# Patient Record
Sex: MALE | Race: WHITE | Employment: FULL TIME | URBAN - METROPOLITAN AREA
[De-identification: names, ages, dates, MRNs, and addresses within clinical notes are randomized per-mention and may not be internally consistent; named-entity substitution may affect disease eponyms.]

---

## 2018-08-01 VITALS
RESPIRATION RATE: 20 BRPM | OXYGEN SATURATION: 96 % | DIASTOLIC BLOOD PRESSURE: 92 MMHG | HEIGHT: 72 IN | HEART RATE: 98 BPM | WEIGHT: 214 LBS | BODY MASS INDEX: 28.99 KG/M2 | SYSTOLIC BLOOD PRESSURE: 166 MMHG | TEMPERATURE: 98.1 F

## 2018-08-01 PROCEDURE — 99282 EMERGENCY DEPT VISIT SF MDM: CPT

## 2018-08-01 RX ORDER — METFORMIN HYDROCHLORIDE 500 MG/1
1000 TABLET ORAL 2 TIMES DAILY WITH MEALS
COMMUNITY

## 2018-08-01 RX ORDER — CLOPIDOGREL BISULFATE 75 MG/1
75 TABLET ORAL
COMMUNITY

## 2018-08-01 RX ORDER — METOPROLOL TARTRATE 100 MG/1
TABLET ORAL 2 TIMES DAILY
COMMUNITY

## 2018-08-01 RX ORDER — GUAIFENESIN 100 MG/5ML
81 LIQUID (ML) ORAL DAILY
COMMUNITY

## 2018-08-01 RX ORDER — LOSARTAN POTASSIUM 100 MG/1
100 TABLET ORAL DAILY
COMMUNITY

## 2018-08-01 RX ORDER — LORATADINE 10 MG/1
10 TABLET ORAL
COMMUNITY

## 2018-08-02 ENCOUNTER — HOSPITAL ENCOUNTER (EMERGENCY)
Age: 53
Discharge: HOME | End: 2018-08-02
Payer: OTHER MISCELLANEOUS

## 2018-08-02 ENCOUNTER — APPOINTMENT (OUTPATIENT)
Dept: GENERAL RADIOLOGY | Age: 53
End: 2018-08-02
Payer: OTHER MISCELLANEOUS

## 2018-08-02 DIAGNOSIS — S61.412A LACERATION OF LEFT HAND WITHOUT FOREIGN BODY, INITIAL ENCOUNTER: Primary | ICD-10-CM

## 2018-08-02 DIAGNOSIS — S81.811A LACERATION OF RIGHT LOWER LEG, INITIAL ENCOUNTER: ICD-10-CM

## 2018-08-02 PROCEDURE — 73590 X-RAY EXAM OF LOWER LEG: CPT

## 2018-08-02 PROCEDURE — 77030002916 HC SUT ETHLN J&J -A

## 2018-08-02 PROCEDURE — 77030002986 HC SUT PROL J&J -A

## 2018-08-02 PROCEDURE — 74011250636 HC RX REV CODE- 250/636

## 2018-08-02 PROCEDURE — 75810000293 HC SIMP/SUPERF WND  RPR

## 2018-08-02 PROCEDURE — 73130 X-RAY EXAM OF HAND: CPT

## 2018-08-02 RX ORDER — LIDOCAINE HYDROCHLORIDE 10 MG/ML
10 INJECTION INFILTRATION; PERINEURAL
Status: COMPLETED | OUTPATIENT
Start: 2018-08-02 | End: 2018-08-02

## 2018-08-02 RX ORDER — CEPHALEXIN 500 MG/1
500 CAPSULE ORAL 4 TIMES DAILY
Qty: 28 CAP | Refills: 0 | Status: SHIPPED | OUTPATIENT
Start: 2018-08-02 | End: 2018-08-09

## 2018-08-02 RX ORDER — LIDOCAINE HYDROCHLORIDE 10 MG/ML
INJECTION INFILTRATION; PERINEURAL
Status: DISCONTINUED
Start: 2018-08-02 | End: 2018-08-02 | Stop reason: HOSPADM

## 2018-08-02 RX ADMIN — LIDOCAINE HYDROCHLORIDE 10 ML: 10 INJECTION, SOLUTION INFILTRATION; PERINEURAL at 01:26

## 2018-08-02 NOTE — ED PROVIDER NOTES
HPI Comments:  
 
29-year-old male fell at work lacerating his right lower leg and left hand on metal grate. He did not strike his head and did not lose consciousness and did not strike his abdomen or chest.  Patient is on Plavix secondary to CAD. Patient is a 52 y.o. male presenting with skin laceration. The history is provided by the patient and the spouse. Laceration The incident occurred less than 1 hour ago. The laceration is located on the left hand and right leg. The laceration is 4 cm in size. The injury mechanism is a metal edge. Foreign body present: no. The pain is mild. The pain has been constant since onset. Pertinent negatives include no numbness. Past Medical History:  
Diagnosis Date  CAD (coronary artery disease) History reviewed. No pertinent surgical history. History reviewed. No pertinent family history. Social History Social History  Marital status:  Spouse name: N/A  
 Number of children: N/A  
 Years of education: N/A Occupational History  Not on file. Social History Main Topics  Smoking status: Not on file  Smokeless tobacco: Not on file  Alcohol use Not on file  Drug use: Not on file  Sexual activity: Not on file Other Topics Concern  Not on file Social History Narrative  No narrative on file ALLERGIES: Review of patient's allergies indicates no known allergies. Review of Systems Constitutional: Negative. Negative for activity change. HENT: Negative. Eyes: Negative. Respiratory: Negative. Cardiovascular: Negative. Gastrointestinal: Negative. Genitourinary: Negative. Musculoskeletal: Negative. Skin: Negative. Neurological: Negative. Negative for numbness. Psychiatric/Behavioral: Negative. All other systems reviewed and are negative. Vitals:  
 08/01/18 2347 BP: (!) 166/92 Pulse: 98 Resp: 20 Temp: 98.1 °F (36.7 °C) SpO2: 96% Weight: 97.1 kg (214 lb) Height: 6' (1.829 m) Physical Exam  
Constitutional: He is oriented to person, place, and time. He appears well-developed and well-nourished. No distress. HENT:  
Head: Normocephalic and atraumatic. Right Ear: External ear normal.  
Left Ear: External ear normal.  
Nose: Nose normal.  
Mouth/Throat: Oropharynx is clear and moist. No oropharyngeal exudate. Eyes: Conjunctivae and EOM are normal. Pupils are equal, round, and reactive to light. Right eye exhibits no discharge. Left eye exhibits no discharge. No scleral icterus. Neck: Normal range of motion. Neck supple. No JVD present. No tracheal deviation present. Cardiovascular: Normal rate, regular rhythm and intact distal pulses. Pulmonary/Chest: Effort normal and breath sounds normal. No stridor. No respiratory distress. He has no wheezes. He exhibits no tenderness. Abdominal: Soft. Bowel sounds are normal. He exhibits no distension and no mass. There is no tenderness. Musculoskeletal: Normal range of motion. He exhibits no edema or tenderness. Left hand: He exhibits laceration. Hands: 
     Right lower leg: He exhibits laceration. Legs: 
Neurological: He is alert and oriented to person, place, and time. No cranial nerve deficit. Skin: Skin is warm and dry. No rash noted. He is not diaphoretic. No erythema. No pallor. Psychiatric: He has a normal mood and affect. His behavior is normal. Thought content normal.  
Nursing note and vitals reviewed. MDM Number of Diagnoses or Management Options Diagnosis management comments: Assessment laceration to hand and leg. Plan suture repair after thorough cleaning and dressing. Antibiotics for prophylaxis Amount and/or Complexity of Data Reviewed Tests in the radiology section of CPT®: ordered and reviewed ED Course Wound Repair 
Date/Time: 8/2/2018 1:46 AM 
Performed by: attendingPreparation: skin prepped with Betadine and skin prepped with Shur-Clens Location details: right leg Wound length:2.5 cm or less Anesthesia: local infiltration Anesthesia: 
Local Anesthetic: lidocaine 1% without epinephrine Foreign bodies: no foreign bodies Irrigation solution: saline Irrigation method: syringe Debridement: minimal 
Skin closure: Prolene and 4-0 nylon Number of sutures: 3 Technique: simple Approximation: loose Dressing: antibiotic ointment and non-adhesive packing strip Patient tolerance: Patient tolerated the procedure well with no immediate complications My total time at bedside, performing this procedure was 1-15 minutes. Wound Repair 
Date/Time: 8/2/2018 1:47 AM 
Performed by: attendingPreparation: skin prepped with Shur-Clens, skin prepped with Betadine and sterile field established Location details: left hand Wound length:2.5 cm or less Anesthesia: local infiltration Foreign bodies: no foreign bodies Irrigation solution: saline Irrigation method: syringe Debridement: minimal 
Skin closure: 4-0 nylon and Prolene Number of sutures: 4 Technique: simple Approximation: loose Dressing: non-adhesive packing strip and antibiotic ointment Patient tolerance: Patient tolerated the procedure well with no immediate complications My total time at bedside, performing this procedure was 1-15 minutes.

## 2018-08-02 NOTE — ED NOTES
I have reviewed discharge instructions with the patient. The patient verbalized understanding. Patient left ED via Discharge Method: ambulatory to Home with (insert name of family/friend, self, transport wife). Opportunity for questions and clarification provided. Patient given 1 scripts. To continue your aftercare when you leave the hospital, you may receive an automated call from our care team to check in on how you are doing. This is a free service and part of our promise to provide the best care and service to meet your aftercare needs.  If you have questions, or wish to unsubscribe from this service please call [phone]. Thank you for Choosing our [facility] Department.

## 2018-08-02 NOTE — ED NOTES
Dressings on the left shin and the right fold of 2nd finger, areas cleansed and then xeroform guaze applied and coban

## 2018-08-02 NOTE — DISCHARGE INSTRUCTIONS

## 2018-08-31 ENCOUNTER — HOSPITAL ENCOUNTER (OUTPATIENT)
Dept: PHYSICAL THERAPY | Age: 53
Discharge: HOME | End: 2018-08-31
Payer: COMMERCIAL

## 2018-08-31 PROCEDURE — 97140 MANUAL THERAPY 1/> REGIONS: CPT

## 2018-08-31 PROCEDURE — 97161 PT EVAL LOW COMPLEX 20 MIN: CPT

## 2018-08-31 PROCEDURE — 97110 THERAPEUTIC EXERCISES: CPT

## 2018-08-31 NOTE — THERAPY EVALUATION
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 5 = High Risk)  MD Orders: evaluate and treat MEDICAL/REFERRING DIAGNOSIS:Right ankle sprain,chondromalacia right knee  DATE OF ONSET:   REFERRING PHYSICIAN:Sammie Deluna PA  RETURN PHYSICIAN APPOINTMENT: did not specify      INITIAL ASSESSMENT:  Mr. Warden Bautista presents to physical therapy with symptoms of R knee and ankle pain after a fall at work. The examination reveals decreased knee and ankle ROM, quad weakness and decreased overall function due to pain. The examination is of low complexity due to a stable clinical presentation. Skilled physical therapy is recommended to progress the plan of care in order for the patient to return to full function with minimal symptoms. PROBLEM LIST (Impacting functional limitations):  1. Decreased Strength  2. Decreased ADL/Functional Activities  3. Increased Pain  4. Decreased Activity Tolerance  5. Decreased Flexibility/Joint Mobility  6. Decreased [place] with Home Exercise Program INTERVENTIONS PLANNED:  1. Cold  2. Heat  3. Home Exercise Program (HEP)  4. Manual Therapy  5. Range of Motion (ROM)  6. Therapeutic Exercise/Strengthening     TREATMENT PLAN:  Effective Dates: 18 TO 10-31-18. Frequency/Duration: 2 times a week for 2 weeks and 1x/wk for the next 2 weeks  GOALS: (Goals have been discussed and agreed upon with patient.)  SHORT-TERM FUNCTIONAL GOALS: Time Frame: 2-4 wks  1. Patient will report 25-50% reduction in overall symptoms  2. Patient will be compliant with HEP and plan of care  3.   Patient will have 0-125 deg ROM of the knee  4. Patient will improve swelling by 1 cm on the figure 8 measurement   5. Patient will improve on the LEFS by 5-7 points  DISCHARGE GOALS: Time Frame: 10-12 wk  1. Patient will report % reduction in overall symptoms in order to be able to have full function with decreased symptoms  2. Patient will be able to squat with minimal pain as needed at his job with minimal pain (0-2/10)  3. Patient will be able to ambulate with a symmetrical gait pattern as judged by therapist in order to able to perform activities of daily living  4. Patient will score 60 or greater on the LEFS points in order to demonstrate improved function with less pain    Rehabilitation Potential For Stated Goals: Good    Regarding Deitra Caprice therapy, I certify that the treatment plan above will be carried out by a therapist or under their direction. Thank you for this referral,  Lena Tirado PT,DPT    Referring Physician Signature: SHAWN Parra _____________________________________________________ Date: __________________________________          HISTORY:   History of Present Injury/Illness (Reason for Referral): Reports that he fell at work on 8-1-18. He had puncture wounds in his lower leg. He had a lot of bleeding during that time requiring stiches in one of the wounds. He had an MRI on his knee revealed a meniscus tear. He went to the medical facility at [address] [place] [address] and they sent him to the ED where he got stiches for his wounds. The next day he went to the work well doctor and was then referred to Dr. Nishi Ruggiero. He also had hand tingling afterwards. He was put on a blood thinner to prevent blood clots after having a negative ultrasound. He had a steroid shot in the knee with minimal benefit at this point. No numbness in the foot reported. 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 2 stents, diabetes type II   Social History/Living Environment: lives in a house with family  Prior Level of Function/Work/Activity:independent / works at [employer]  Current Medications:   Aspirin, losartan, clopidogrel, omprezole, atovastatin, metoprolol, metformin, claratan   Date Last Reviewed:  9/3/2018   Number of Personal Factors/Comorbidities that affect the Plan of Care: 0: LOW COMPLEXITY   EXAMINATION:   (Abbreviations: P-pain, NP- no pain, wnl-within normal limits)    Observation/Orthostatic Postural Assessment:    Relaxed standing posture:  Several healing wounds noted on anterior portion of his R lower leg    Palpation/tone/tissue texture:    ASIS: n/a  PSIS:n/a  Leg Length: supine:n/a         Long Sitting: n/a      Soft tissue:  · Hip flexors:n/a  · Hamstrings: increased tightness on R  · IT band: na  · gastroc/soleus/posterior tibialis: increased tightness on R  · Anterior tibialis: Increased tightness on R      Pulses: Dorsal pedis: R: 1+      Posterior tibialis: R 1+    Edema measurements:  Date:  8-31-18      Activity/Exercise Left  Right   Figure 8 56 cm 59 cm   Mid calf 41 cm 42cm       ROM:   Knee ROM Date:  8-31-18       Right Left   Flexion 108 140   Extension 15 0      Ankle ROM Date:  8-31-18       Right Left   DF 0 10   PF n/a wnl   Inversion - -   Eversion - -           Strength:     Hip Strength Date:  8-31-18       Right Left   Flexion 5 5   Extension 4+ 5   IR n/a n/a   ER n/a n/a   Abduction 5 5      Knee Strength Date:  8-31-18       Right Left   Flexion 4 5   Extension 4 5             Special Tests:   Stability tests:  Lachmans: (-)  [place]:  AM: (-)          AL: (-)  Posterior drawer: (-)  Posterior Sag: (-)    Varus: 0 deg: (-)   30 deg: (-)  Valgus: 0 deg: (-)   30 deg: (+) mild pain on R    Meniscus cluster test  1. End range flexion: -  2. End range extension: + for pain/tightness   3. Joint line tenderness: medical joint line tenderness   4. Report of locking: (-)  5.  [place]reys:     Neurological Screen:   Myotomes: strong in bilateral LE's     Dermatomes: intact in bilateral LE's         Reflexes: n/a         Neural Tension Tests: -  Functional Mobility:    · Double leg squat: n/a  · Single leg squat:  L:n/a         R:n/a  · Gait Pattern: ambulates with decreased terminal stance on R   Balance:  Single leg   L: n/a  R:n/a        Body Structures Involved:  1. Joints  2. Muscles  3. Ligaments Body Functions Affected:  1. Sensory/Pain  2. Neuromusculoskeletal  3. Movement Related Activities and Participation Affected:  1. General Tasks and Demands  2. Mobility  3. Self Care  4. Domestic Life  5. Interpersonal Interactions and Relationships  6. Community, Social and [place]   Number of elements that affect the Plan of Care: 4+: HIGH COMPLEXITY   CLINICAL PRESENTATION:   Presentation: Stable and uncomplicated: LOW COMPLEXITY   CLINICAL DECISION MAKING:   Outcome Measure: Tool Used: Lower Extremity Functional Scale (LEFS)  Score:  Initial: 27/80 Most Recent: X/80 (Date: -- )   Interpretation of Score: 20 questions each scored on a 5 point scale with 0 representing \"extreme difficulty or unable to perform\" and 4 representing \"no difficulty\". The lower the score, the greater the functional disability. 80/80 represents no disability. Minimal detectable change is 9 points. Score 80 79-63 62-48 47-32 31-16 15-1 0   Modifier CH CI CJ CK CL CM CN     Medical Necessity:   · Patient is expected to demonstrate progress in strength, range of motion and symptom levels to return to full function. Reason for Services/Other Comments:  · Patient continues to require skilled intervention due to ongoing symptoms. Use of outcome tool(s) and clinical judgement create a POC that gives a: Clear prediction of patient's progress: LOW COMPLEXITY   TREATMENT:   (In addition to Assessment/Re-Assessment sessions the following treatments were rendered)    Subjective Pre-Treatment Symptoms: Reports that he has moderate pain but is slowly improving.  Still has a hard time with knee motion. Therapeutic Exercise: (10 min) Done in order to improve strength, ROM and understanding of current condition. Date:  8-31-18   Activity/Exercise Parameters   Education Discussed examination findings, HEP, plan of care   Taping Taped patella into distraction, used 2 I strips   Heel slides 10x   ankle PF/DF 20x       Manual Therapy: (10) Done in order to improve joint and soft tissue mobility,reduce muscle guarding, and decrease muscle tone  · Knee extension mobilization, grade III-IV  · Patella mobilization, all directions  Modalities: (15 min) Done in order to reduce swelling and pain  Cryotherapy w/ vaso-pneumatic compression, 15 min, 34 deg  Treatment/Session Assessment:  Patient tolerated the session well. His HEP and plan of care were discussed. · Pain: Initial:    3/10 at rest, 6/10 with movement Post Session:  Same as initial  ·   Compliance with Program/Exercises: Will assess as treatment progresses. · Recommendations/Intent for next treatment session: \"Next visit will focus on progressing the patients plan of care\".     Future Appointments  Date Time Provider Alan Pulnkett   9/5/2018 2:15 PM Tom Frank, PT, DPT [facility]   9/7/2018 10:00 AM Tom Marie PT, DPT [facility]   9/10/2018 9:15 AM Tom Frank PT, DPT [facility]   9/12/2018 8:30 AM Tom Frank, PT, DPT [facility]   9/17/2018 10:00 AM Tom Frank, PT, DPT [facility]   9/19/2018 10:00 AM Tom Frank PT, DPT [facility]   9/26/2018 10:45 AM Tom Frank, PT, DPT [facility]   10/3/2018 10:00 AM Tom Frank, PT, DPT [facility]     Total Treatment Duration: 35 min, evaluation  30 min  PT Patient Time In/Time Out  Time In: 1045  Time Out: Democracia [id number] PT, DPT

## 2018-09-03 NOTE — PROGRESS NOTES
Ambulatory/Rehab Services H2 Model Falls Risk Assessment    Risk Factor Pts. ·   Confusion/Disorientation/Impulsivity  []    4 ·   Symptomatic Depression  []   2 ·   Altered Elimination  []   1 ·   Dizziness/Vertigo  []   1 ·   Gender (Male)  [x]   1 ·   Any administered antiepileptics (anticonvulsants):  []   2 ·   Any administered benzodiazepines:  []   1 ·   Visual Impairment (specify):  []   1 ·   Portable Oxygen Use  []   1 ·   Orthostatic ? BP  []   1 ·   History of Recent Falls (within 3 mos.)  []   5     Ability to Rise from Chair (choose one) Pts. ·   Ability to rise in a single movement  [x]   0 ·   Pushes up, successful in one attempt  []   1 ·   Multiple attempts, but successful  []   3 ·   Unable to rise without assistance  []   4   Total: (5 or greater = High Risk) 1     Falls Prevention Plan:   []                Physical Limitations to Exercise (specify):   []                Mobility Assistance Device (type):   []                Exercise/Equipment Adaptation (specify):    ©2010 [facility] [address] Patent #[id number].  Federal Law prohibits the replication, distribution or use without written permission from [facility] [employer]

## 2018-09-05 ENCOUNTER — HOSPITAL ENCOUNTER (OUTPATIENT)
Dept: PHYSICAL THERAPY | Age: 53
Discharge: HOME | End: 2018-09-05
Payer: COMMERCIAL

## 2018-09-05 PROCEDURE — 97110 THERAPEUTIC EXERCISES: CPT

## 2018-09-05 PROCEDURE — 97140 MANUAL THERAPY 1/> REGIONS: CPT

## 2018-09-05 NOTE — PROGRESS NOTES
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 5 = High Risk)  MD Orders: evaluate and treat MEDICAL/REFERRING DIAGNOSIS:Right ankle sprain,chondromalacia right knee  DATE OF ONSET:   REFERRING PHYSICIAN:Rancho Deluna PA  RETURN PHYSICIAN APPOINTMENT: did not specify      INITIAL ASSESSMENT:  Mr. Lacy Mejia presents to physical therapy with symptoms of R knee and ankle pain after a fall at work. The examination reveals decreased knee and ankle ROM, quad weakness and decreased overall function due to pain. The examination is of low complexity due to a stable clinical presentation. Skilled physical therapy is recommended to progress the plan of care in order for the patient to return to full function with minimal symptoms. PROBLEM LIST (Impacting functional limitations):  1. Decreased Strength  2. Decreased ADL/Functional Activities  3. Increased Pain  4. Decreased Activity Tolerance  5. Decreased Flexibility/Joint Mobility  6. Decreased [place] with Home Exercise Program INTERVENTIONS PLANNED:  1. Cold  2. Heat  3. Home Exercise Program (HEP)  4. Manual Therapy  5. Range of Motion (ROM)  6. Therapeutic Exercise/Strengthening     TREATMENT PLAN:  Effective Dates: 18 TO 10-31-18. Frequency/Duration: 2 times a week for 2 weeks and 1x/wk for the next 2 weeks  GOALS: (Goals have been discussed and agreed upon with patient.)  SHORT-TERM FUNCTIONAL GOALS: Time Frame: 2-4 wks  1. Patient will report 25-50% reduction in overall symptoms  2. Patient will be compliant with HEP and plan of care  3.   Patient will have 0-125 deg ROM of the knee  4. Patient will improve swelling by 1 cm on the figure 8 measurement   5. Patient will improve on the LEFS by 5-7 points  DISCHARGE GOALS: Time Frame: 10-12 wk  1. Patient will report % reduction in overall symptoms in order to be able to have full function with decreased symptoms  2. Patient will be able to squat with minimal pain as needed at his job with minimal pain (0-2/10)  3. Patient will be able to ambulate with a symmetrical gait pattern as judged by therapist in order to able to perform activities of daily living  4. Patient will score 60 or greater on the LEFS points in order to demonstrate improved function with less pain    Rehabilitation Potential For Stated Goals: Good    Regarding Eldon Heller therapy, I certify that the treatment plan above will be carried out by a therapist or under their direction. Thank you for this referral,  Patsy Case PT,DPT    Referring Physician Signature: SHAWN Rae _____________________________________________________ Date: __________________________________          HISTORY:   History of Present Injury/Illness (Reason for Referral): Reports that he fell at work on 8-1-18. He had puncture wounds in his lower leg. He had a lot of bleeding during that time requiring stiches in one of the wounds. He had an MRI on his knee revealed a meniscus tear. He went to the medical facility at [address] [place] [address] and they sent him to the ED where he got stiches for his wounds. The next day he went to the work well doctor and was then referred to Dr. Rosa Vo. He also had hand tingling afterwards. He was put on a blood thinner to prevent blood clots after having a negative ultrasound. He had a steroid shot in the knee with minimal benefit at this point. No numbness in the foot reported. 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 2 stents, diabetes type II   Social History/Living Environment: lives in a house with family  Prior Level of Function/Work/Activity:independent / works at [employer]  Current Medications:   Aspirin, losartan, clopidogrel, omprezole, atovastatin, metoprolol, metformin, claratan   Date Last Reviewed:  9/5/2018   Number of Personal Factors/Comorbidities that affect the Plan of Care: 0: LOW COMPLEXITY   EXAMINATION:   (Abbreviations: P-pain, NP- no pain, wnl-within normal limits)    Observation/Orthostatic Postural Assessment:    Relaxed standing posture:  Several healing wounds noted on anterior portion of his R lower leg    Palpation/tone/tissue texture:    ASIS: n/a  PSIS:n/a  Leg Length: supine:n/a         Long Sitting: n/a      Soft tissue:  · Hip flexors:n/a  · Hamstrings: increased tightness on R  · IT band: na  · gastroc/soleus/posterior tibialis: increased tightness on R  · Anterior tibialis: Increased tightness on R      Pulses: Dorsal pedis: R: 1+      Posterior tibialis: R 1+    Edema measurements:  Date:  8-31-18      Activity/Exercise Left  Right   Figure 8 56 cm 59 cm   Mid calf 41 cm 42cm       ROM:   Knee ROM Date:  8-31-18       Right Left   Flexion 108 140   Extension 15 0      Ankle ROM Date:  8-31-18       Right Left   DF 0 10   PF n/a wnl   Inversion - -   Eversion - -           Strength:     Hip Strength Date:  8-31-18       Right Left   Flexion 5 5   Extension 4+ 5   IR n/a n/a   ER n/a n/a   Abduction 5 5      Knee Strength Date:  8-31-18       Right Left   Flexion 4 5   Extension 4 5             Special Tests:   Stability tests:  Lachmans: (-)  Slocums:  AM: (-)          AL: (-)  Posterior drawer: (-)  Posterior Sag: (-)    Varus: 0 deg: (-)   30 deg: (-)  Valgus: 0 deg: (-)   30 deg: (+) mild pain on R    Meniscus cluster test  1. End range flexion: -  2. End range extension: + for pain/tightness   3. Joint line tenderness: medical joint line tenderness   4. Report of locking: (-)  5.  McMurreys:     Neurological Screen:   Myotomes: strong in bilateral LE's     Dermatomes: intact in bilateral LE's         Reflexes: n/a         Neural Tension Tests: -  Functional Mobility:    · Double leg squat: n/a  · Single leg squat:  L:n/a         R:n/a  · Gait Pattern: ambulates with decreased terminal stance on R   Balance:  Single leg   L: n/a  R:n/a        Body Structures Involved:  1. Joints  2. Muscles  3. Ligaments Body Functions Affected:  1. Sensory/Pain  2. Neuromusculoskeletal  3. Movement Related Activities and Participation Affected:  1. General Tasks and Demands  2. Mobility  3. Self Care  4. Domestic Life  5. Interpersonal Interactions and Relationships  6. Community, Social and [place]   Number of elements that affect the Plan of Care: 4+: HIGH COMPLEXITY   CLINICAL PRESENTATION:   Presentation: Stable and uncomplicated: LOW COMPLEXITY   CLINICAL DECISION MAKING:   Outcome Measure: Tool Used: Lower Extremity Functional Scale (LEFS)  Score:  Initial: 27/80 Most Recent: X/80 (Date: -- )   Interpretation of Score: 20 questions each scored on a 5 point scale with 0 representing \"extreme difficulty or unable to perform\" and 4 representing \"no difficulty\". The lower the score, the greater the functional disability. 80/80 represents no disability. Minimal detectable change is 9 points. Score 80 79-63 62-48 47-32 31-16 15-1 0   Modifier CH CI CJ CK CL CM CN     Medical Necessity:   · Patient is expected to demonstrate progress in strength, range of motion and symptom levels to return to full function. Reason for Services/Other Comments:  · Patient continues to require skilled intervention due to ongoing symptoms. Use of outcome tool(s) and clinical judgement create a POC that gives a: Clear prediction of patient's progress: LOW COMPLEXITY   TREATMENT:   (In addition to Assessment/Re-Assessment sessions the following treatments were rendered)    Subjective Pre-Treatment Symptoms: Reports that he has moderate pain the day after the evaluation. It calmed down some but still hurts. Therapeutic Exercise: (25 min) Done in order to improve strength, ROM and understanding of current condition. Date:  8-31-18 Date  9-5-18   Activity/Exercise Parameters    Education Discussed examination findings, HEP, plan of care · Discussed HEP  · Discussed self soft tissue of the anterior lower leg compartment    Biking  10 min   Taping Taped patella into distraction, used 2 I strips Taped patella into distraction using 2 I strips of kinesio tape   Heel slides 10x HEP   ankle PF/DF 20x 10x   SLR  10x, 3 sets   Side-lying abd  In hip extension, 10x, 3 sets   Gait training  Worked on terminal knee extension with gait   Manual Therapy: (20) Done in order to improve joint and soft tissue mobility,reduce muscle guarding, and decrease muscle tone  · Knee extension mobilization to tibia and femur and with gentle traction, grade III-IV  · Patella mobilization, superior-inferior, medial-lateral, distraction using suction   · Talocrural posterior glide to facilitated DF   · Calcaneal distraction with DF of the ankle  Modalities: (15 min) Done in order to reduce swelling and pain  Ice x 15 min to R knee  Treatment/Session Assessment:  Patient tolerated the session well. He was able to get full knee extension and improved patella mobility but still had tenderness with gait and knee flexion. · Pain: Initial:    Mild to moderate symptoms reported. Post Session:  Same as initial  ·   Compliance with Program/Exercises: Will assess as treatment progresses. · Recommendations/Intent for next treatment session: \"Next visit will focus on progressing the patients plan of care\".     Future Appointments  Date Time Provider Alan Plunkett   9/7/2018 10:00 AM Quintin Tapia PT, DPT [facility]   9/10/2018 9:15 AM Quintin Tapia PT, DPT [facility]   9/12/2018 8:30 AM Quintin Tapia PT, DPT [facility]   9/17/2018 10:00 AM Quintin Tapia PT, DPT [facility]   9/19/2018 10:00 AM [facility]   9/26/2018 10:45 AM [facility]andy Roa PT, [facility]   10/3/2018 10:00 AM Yandy Roa PT, [facility]     Total Treatment Duration: 60 min  PT Patient Time In/Time Out  Time In: 1415  Time Out: Jordan ENGLISH DPT

## 2018-09-07 ENCOUNTER — HOSPITAL ENCOUNTER (OUTPATIENT)
Dept: PHYSICAL THERAPY | Age: 53
Discharge: HOME | End: 2018-09-07
Payer: COMMERCIAL

## 2018-09-07 NOTE — PROGRESS NOTES
Javier Perez  : 1965  Primary: Sary Radha Sahu  Secondary:  [address]  at [facility]  [facility] [address], [place] [address]  [phone]   [phone]        OUTPATIENT DAILY NOTE    NAME/AGE/GENDER: Jaiver Perez is a 52 y.o. male. DATE: 2018    Patient cancelled his appointment today. Will plan to follow up on next scheduled visit.     Hira Livingston, PT, DPT

## 2018-09-10 ENCOUNTER — HOSPITAL ENCOUNTER (OUTPATIENT)
Dept: PHYSICAL THERAPY | Age: 53
Discharge: HOME | End: 2018-09-10
Payer: COMMERCIAL

## 2018-09-10 PROCEDURE — 97110 THERAPEUTIC EXERCISES: CPT

## 2018-09-10 PROCEDURE — 97140 MANUAL THERAPY 1/> REGIONS: CPT

## 2018-09-10 NOTE — PROGRESS NOTES
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 5 = High Risk)  MD Orders: evaluate and treat MEDICAL/REFERRING DIAGNOSIS:Right ankle sprain,chondromalacia right knee  DATE OF ONSET:   REFERRING PHYSICIAN:Ida Deluna PA  RETURN PHYSICIAN APPOINTMENT: did not specify       ASSESSMENT:  Mr. Evan Peterson presents to physical therapy with symptoms of R knee and ankle pain after a fall at work. The examination reveals decreased knee and ankle ROM, quad weakness and decreased overall function due to pain. The examination is of low complexity due to a stable clinical presentation. Skilled physical therapy is recommended to progress the plan of care in order for the patient to return to full function with minimal symptoms. PROBLEM LIST (Impacting functional limitations):  1. Decreased Strength  2. Decreased ADL/Functional Activities  3. Increased Pain  4. Decreased Activity Tolerance  5. Decreased Flexibility/Joint Mobility  6. Decreased [place] with Home Exercise Program INTERVENTIONS PLANNED:  1. Cold  2. Heat  3. Home Exercise Program (HEP)  4. Manual Therapy  5. Range of Motion (ROM)  6. Therapeutic Exercise/Strengthening     TREATMENT PLAN:  Effective Dates: 18 TO 10-31-18. Frequency/Duration: 2 times a week for 2 weeks and 1x/wk for the next 2 weeks  GOALS: (Goals have been discussed and agreed upon with patient.)  SHORT-TERM FUNCTIONAL GOALS: Time Frame: 2-4 wks  1. Patient will report 25-50% reduction in overall symptoms  2. Patient will be compliant with HEP and plan of care  3. Patient will have 0-125 deg ROM of the knee  4. Patient will improve swelling by 1 cm on the figure 8 measurement   5. Patient will improve on the LEFS by 5-7 points  DISCHARGE GOALS: Time Frame: 10-12 wk  1. Patient will report % reduction in overall symptoms in order to be able to have full function with decreased symptoms  2. Patient will be able to squat with minimal pain as needed at his job with minimal pain (0-2/10)  3. Patient will be able to ambulate with a symmetrical gait pattern as judged by therapist in order to able to perform activities of daily living  4. Patient will score 60 or greater on the LEFS points in order to demonstrate improved function with less pain    Rehabilitation Potential For Stated Goals: Good    Regarding Thuy Jimenez therapy, I certify that the treatment plan above will be carried out by a therapist or under their direction. Thank you for this referral,  Hannah De Los Santos PT,DPT    Referring Physician Signature: SHAWN Bianchi _____________________________________________________ Date: __________________________________          HISTORY:   History of Present Injury/Illness (Reason for Referral): Reports that he fell at work on 8-1-18. He had puncture wounds in his lower leg. He had a lot of bleeding during that time requiring stiches in one of the wounds. He had an MRI on his knee revealed a meniscus tear. He went to the medical facility at [facility] and they sent him to the ED where he got stiches for his wounds. The next day he went to the work well doctor and was then referred to Dr. Xenia Esquivel. He also had hand tingling afterwards. He was put on a blood thinner to prevent blood clots after having a negative ultrasound. He had a steroid shot in the knee with minimal benefit at this point. No numbness in the foot reported. 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 2 stents, diabetes type II   Social History/Living Environment: lives in a house with family  Prior Level of Function/Work/Activity:independent / works at [employer]  Current Medications:   Aspirin, losartan, clopidogrel, omprezole, atovastatin, metoprolol, metformin, claratan   Date Last Reviewed:  9/10/2018   Number of Personal Factors/Comorbidities that affect the Plan of Care: 0: LOW COMPLEXITY   EXAMINATION:   (Abbreviations: P-pain, NP- no pain, wnl-within normal limits)    Observation/Orthostatic Postural Assessment:    Relaxed standing posture:  Several healing wounds noted on anterior portion of his R lower leg    Palpation/tone/tissue texture:    ASIS: n/a  PSIS:n/a  Leg Length: supine:n/a         Long Sitting: n/a      Soft tissue:  · Hip flexors:n/a  · Hamstrings: increased tightness on R  · IT band: na  · gastroc/soleus/posterior tibialis: increased tightness on R  · Anterior tibialis: Increased tightness on R      Pulses: Dorsal pedis: R: 1+      Posterior tibialis: R 1+    Edema measurements:  Date:  8-31-18      Activity/Exercise Left  Right   Figure 8 56 cm 59 cm   Mid calf 41 cm 42cm       ROM:   Knee ROM Date:  8-31-18       Right Left   Flexion 108 140   Extension 15 0      Ankle ROM Date:  8-31-18       Right Left   DF 0 10   PF n/a wnl   Inversion - -   Eversion - -           Strength:     Hip Strength Date:  8-31-18       Right Left   Flexion 5 5   Extension 4+ 5   IR n/a n/a   ER n/a n/a   Abduction 5 5      Knee Strength Date:  8-31-18       Right Left   Flexion 4 5   Extension 4 5             Special Tests:   Stability tests:  Lachmans: (-)  Slocums:  AM: (-)          AL: (-)  Posterior drawer: (-)  Posterior Sag: (-)    Varus: 0 deg: (-)   30 deg: (-)  Valgus: 0 deg: (-)   30 deg: (+) mild pain on R    Meniscus cluster test  1. End range flexion: -  2. End range extension: + for pain/tightness   3. Joint line tenderness: medical joint line tenderness   4. Report of locking: (-)  5.  McMurreys:     Neurological Screen:   Myotomes: strong in bilateral LE's     Dermatomes: intact in bilateral LE's Reflexes: n/a         Neural Tension Tests: -  Functional Mobility:    · Double leg squat: n/a  · Single leg squat:  L:n/a         R:n/a  · Gait Pattern: ambulates with decreased terminal stance on R   Balance:  Single leg   L: n/a  R:n/a        CLINICAL DECISION MAKING:   Outcome Measure: Tool Used: Lower Extremity Functional Scale (LEFS)  Score:  Initial: 27/80 Most Recent: X/80 (Date: -- )   Interpretation of Score: 20 questions each scored on a 5 point scale with 0 representing \"extreme difficulty or unable to perform\" and 4 representing \"no difficulty\". The lower the score, the greater the functional disability. 80/80 represents no disability. Minimal detectable change is 9 points. Score 80 79-63 62-48 47-32 31-16 15-1 0   Modifier CH CI CJ CK CL CM CN     Medical Necessity:   · Patient is expected to demonstrate progress in strength, range of motion and symptom levels to return to full function. Reason for Services/Other Comments:  · Patient continues to require skilled intervention due to ongoing symptoms. Use of outcome tool(s) and clinical judgement create a POC that gives a: Clear prediction of patient's progress: LOW COMPLEXITY   TREATMENT:   (In addition to Assessment/Re-Assessment sessions the following treatments were rendered)    Subjective Pre-Treatment Symptoms: Reports that he his swelling is getting better. Pain is similar. Therapeutic Exercise: (25 min) Done in order to improve strength, ROM and understanding of current condition.     Date  9-5-18 Date  9-10-18   Activity/Exercise     Education · Discussed HEP  · Discussed self soft tissue of the anterior lower leg compartment  · Discussed HEP   Biking 10 min 10 min for warm up   Taping Taped patella into distraction using 2 I strips of kinesio tape Taped ankle into eversion to limit inversion forces, used Kinesio tape   Heel slides HEP HEP   ankle PF/DF 10x    SLR 10x, 3 sets 10x,  3 sets   Side-lying abd In hip extension, 10x, 3 sets 10x, 3 sets   Gait training Worked on terminal knee extension with gait Worked on terminal stance of gait    Terminal knee extension   Blue band, 10x, 2 sets, ipsilateral support   Ankle proprioception  Foot on wobble board, eyes closed in sitting, therapist provided resistance in all directions, patient asked to keep it stable, 5 min   Manual Therapy: (20) Done in order to improve joint and soft tissue mobility,reduce muscle guarding, and decrease muscle tone  · Knee extension mobilization to tibia and femur and with gentle traction, grade III-IV  · Patella mobilization, superior-inferior, medial-lateral, distraction using suction   · Talocrural posterior glide to facilitated DF   · Calcaneal distraction with DF of the ankle  Modalities: (15 min) Done in order to reduce swelling and pain  Ice x 15 min to R knee  Treatment/Session Assessment:  Patient tolerated the session well. Progressing gradually with motion. Taped ankle to limit inversion forces. Discussed using a brace. · Pain: Initial:   Mild symptoms reported at rest Post Session:  Minimal symptoms after ice ·   Compliance with Program/Exercises: Will assess as treatment progresses. · Recommendations/Intent for next treatment session: \"Next visit will focus on progressing the patients plan of care\".     Future Appointments  Date Time Provider Alan Plunkett   9/12/2018 8:30 AM Van Jackson [facility], [facility]   9/17/2018 10:00 AM Van Jackson PT, DPT [facility]   9/19/2018 10:00 AM Van Jackson PT, DPT [facility]   9/26/2018 10:45 AM Van Jackson PT, DPT [facility]   10/3/2018 10:00 AM Van Jackson PT, DPT [facility]     Total Treatment Duration: 60 min  PT Patient Time In/Time Out  Time In: 0915  Time Out: 1015    Kd Cabrera PT, DPT

## 2018-09-12 ENCOUNTER — HOSPITAL ENCOUNTER (OUTPATIENT)
Dept: PHYSICAL THERAPY | Age: 53
Discharge: HOME | End: 2018-09-12
Payer: COMMERCIAL

## 2018-09-12 PROCEDURE — 97110 THERAPEUTIC EXERCISES: CPT

## 2018-09-12 PROCEDURE — 97140 MANUAL THERAPY 1/> REGIONS: CPT

## 2018-09-12 NOTE — PROGRESS NOTES
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 5 = High Risk)  MD Orders: evaluate and treat MEDICAL/REFERRING DIAGNOSIS:Right ankle sprain,chondromalacia right knee  DATE OF ONSET:   REFERRING PHYSICIAN:Sienna Deluna PA  RETURN PHYSICIAN APPOINTMENT: did not specify       ASSESSMENT:  Mr. Martha Peralta presents to physical therapy with symptoms of R knee and ankle pain after a fall at work. The examination reveals decreased knee and ankle ROM, quad weakness and decreased overall function due to pain. The examination is of low complexity due to a stable clinical presentation. Skilled physical therapy is recommended to progress the plan of care in order for the patient to return to full function with minimal symptoms. PROBLEM LIST (Impacting functional limitations):  1. Decreased Strength  2. Decreased ADL/Functional Activities  3. Increased Pain  4. Decreased Activity Tolerance  5. Decreased Flexibility/Joint Mobility  6. Decreased [place] with Home Exercise Program INTERVENTIONS PLANNED:  1. Cold  2. Heat  3. Home Exercise Program (HEP)  4. Manual Therapy  5. Range of Motion (ROM)  6. Therapeutic Exercise/Strengthening     TREATMENT PLAN:  Effective Dates: 18 TO 10-31-18. Frequency/Duration: 2 times a week for 2 weeks and 1x/wk for the next 2 weeks  GOALS: (Goals have been discussed and agreed upon with patient.)  SHORT-TERM FUNCTIONAL GOALS: Time Frame: 2-4 wks  1. Patient will report 25-50% reduction in overall symptoms  2. Patient will be compliant with HEP and plan of care  3. Patient will have 0-125 deg ROM of the knee  4. Patient will improve swelling by 1 cm on the figure 8 measurement   5. Patient will improve on the LEFS by 5-7 points  DISCHARGE GOALS: Time Frame: 10-12 wk  1. Patient will report % reduction in overall symptoms in order to be able to have full function with decreased symptoms  2. Patient will be able to squat with minimal pain as needed at his job with minimal pain (0-2/10)  3. Patient will be able to ambulate with a symmetrical gait pattern as judged by therapist in order to able to perform activities of daily living  4. Patient will score 60 or greater on the LEFS points in order to demonstrate improved function with less pain    Rehabilitation Potential For Stated Goals: Good    Regarding Trish Alex therapy, I certify that the treatment plan above will be carried out by a therapist or under their direction. Thank you for this referral,  Nataliia Johnson PT,DPT    Referring Physician Signature: SHAWN Montanez _____________________________________________________ Date: __________________________________          HISTORY:   History of Present Injury/Illness (Reason for Referral): Reports that he fell at work on 8-1-18. He had puncture wounds in his lower leg. He had a lot of bleeding during that time requiring stiches in one of the wounds. He had an MRI on his knee revealed a meniscus tear. He went to the medical facility at [address] [place] [address] and they sent him to the ED where he got stiches for his wounds. The next day he went to the work well doctor and was then referred to Dr. Song Tyler. He also had hand tingling afterwards. He was put on a blood thinner to prevent blood clots after having a negative ultrasound. He had a steroid shot in the knee with minimal benefit at this point. No numbness in the foot reported. 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 2 stents, diabetes type II   Social History/Living Environment: lives in a house with family  Prior Level of Function/Work/Activity:independent / works at [employer]  Current Medications:   Aspirin, losartan, clopidogrel, omprezole, atovastatin, metoprolol, metformin, claratan   Date Last Reviewed:  9/12/2018   Number of Personal Factors/Comorbidities that affect the Plan of Care: 0: LOW COMPLEXITY   EXAMINATION:   (Abbreviations: P-pain, NP- no pain, wnl-within normal limits)    Observation/Orthostatic Postural Assessment:    Relaxed standing posture:  Several healing wounds noted on anterior portion of his R lower leg    Palpation/tone/tissue texture:    ASIS: n/a  PSIS:n/a  Leg Length: supine:n/a         Long Sitting: n/a      Soft tissue:  · Hip flexors:n/a  · Hamstrings: increased tightness on R  · IT band: na  · gastroc/soleus/posterior tibialis: increased tightness on R  · Anterior tibialis: Increased tightness on R      Pulses: Dorsal pedis: R: 1+      Posterior tibialis: R 1+    Edema measurements:  Date:  8-31-18      Activity/Exercise Left  Right   Figure 8 56 cm 59 cm   Mid calf 41 cm 42cm       ROM:   Knee ROM Date:  8-31-18       Right Left   Flexion 108 140   Extension 15 0      Ankle ROM Date:  8-31-18       Right Left   DF 0 10   PF n/a wnl   Inversion - -   Eversion - -           Strength:     Hip Strength Date:  8-31-18       Right Left   Flexion 5 5   Extension 4+ 5   IR n/a n/a   ER n/a n/a   Abduction 5 5      Knee Strength Date:  8-31-18       Right Left   Flexion 4 5   Extension 4 5             Special Tests:   Stability tests:  Lachmans: (-)  Slocums:  AM: (-)          AL: (-)  Posterior drawer: (-)  Posterior Sag: (-)    Varus: 0 deg: (-)   30 deg: (-)  Valgus: 0 deg: (-)   30 deg: (+) mild pain on R    Meniscus cluster test  1. End range flexion: -  2. End range extension: + for pain/tightness   3. Joint line tenderness: medical joint line tenderness   4. Report of locking: (-)  5.  McMurreys:     Neurological Screen:   Myotomes: strong in bilateral LE's     Dermatomes: intact in bilateral LE's Reflexes: n/a         Neural Tension Tests: -  Functional Mobility:    · Double leg squat: n/a  · Single leg squat:  L:n/a         R:n/a  · Gait Pattern: ambulates with decreased terminal stance on R   Balance:  Single leg   L: n/a  R:n/a        CLINICAL DECISION MAKING:   Outcome Measure: Tool Used: Lower Extremity Functional Scale (LEFS)  Score:  Initial: 27/80 Most Recent: X/80 (Date: -- )   Interpretation of Score: 20 questions each scored on a 5 point scale with 0 representing \"extreme difficulty or unable to perform\" and 4 representing \"no difficulty\". The lower the score, the greater the functional disability. 80/80 represents no disability. Minimal detectable change is 9 points. Score 80 79-63 62-48 47-32 31-16 15-1 0   Modifier CH CI CJ CK CL CM CN     Medical Necessity:   · Patient is expected to demonstrate progress in strength, range of motion and symptom levels to return to full function. Reason for Services/Other Comments:  · Patient continues to require skilled intervention due to ongoing symptoms. Use of outcome tool(s) and clinical judgement create a POC that gives a: Clear prediction of patient's progress: LOW COMPLEXITY   TREATMENT:   (In addition to Assessment/Re-Assessment sessions the following treatments were rendered)    Subjective Pre-Treatment Symptoms: Reports that he is better able to straighten his knee but feels like his calf is more sore. Also felt better in the ankle with taping. Therapeutic Exercise: (25 min) Done in order to improve strength, ROM and understanding of current condition.     Date  9-12-18   Activity/Exercise    Education · Discussed HEP  · Discussed purchasing an ankle brace for stability    Biking 10 min for warm up   Taping Taped patella into distraction, 2 I strips used   Heel slides -   ankle PF/DF 20x   SLR SAQ: 10x, 3 sets, 3lbs   Side-lying abd -   Gait training discussed   Terminal knee extension  Blue band, 10x, 3 sets   Ankle proprioception Asked to keep in neutral, 5 min   Manual Therapy: (20) Done in order to improve joint and soft tissue mobility,reduce muscle guarding, and decrease muscle tone  · Knee extension mobilization to tibia and femur and with gentle traction, grade III-IV  · Patella mobilization, superior-inferior, medial-lateral, distraction using suction   · Talocrural posterior glide to facilitated DF   · Calcaneal distraction with DF of the ankle  Modalities: (15 min) Done in order to reduce swelling and pain  Ice x 15 min to R knee  Treatment/Session Assessment:  Patient tolerated the session well. Continues to progress with ROM. Swelling seems to have decreased. Discussed HEP   · Pain: Initial:   Mild symptoms reported at rest Post Session:  Minimal symptoms after ice ·   Compliance with Program/Exercises: Will assess as treatment progresses. · Recommendations/Intent for next treatment session: \"Next visit will focus on progressing the patients plan of care\".     Future Appointments  Date Time Provider Alan Plunkett   9/17/2018 10:00 AM Dave George PT, DPT [facility]   9/19/2018 10:00 AM Dave George PT, DPT [facility]   9/26/2018 10:45 AM Dave George PT, DPT [facility]   10/3/2018 10:00 AM [facility]     Total Treatment Duration: 60 min  PT Patient Time In/Time Out  Time In: 0830  Time Out: 0930    Roni Aldrich PT, DPT

## 2018-09-17 ENCOUNTER — HOSPITAL ENCOUNTER (OUTPATIENT)
Dept: PHYSICAL THERAPY | Age: 53
Discharge: HOME | End: 2018-09-17
Payer: COMMERCIAL

## 2018-09-17 PROCEDURE — 97140 MANUAL THERAPY 1/> REGIONS: CPT

## 2018-09-17 PROCEDURE — 97110 THERAPEUTIC EXERCISES: CPT

## 2018-09-17 NOTE — PROGRESS NOTES
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 5 = High Risk)  MD Orders: evaluate and treat MEDICAL/REFERRING DIAGNOSIS:Right ankle sprain,chondromalacia right knee  DATE OF ONSET:   REFERRING PHYSICIAN:Pierre Deluna PA  RETURN PHYSICIAN APPOINTMENT: did not specify       ASSESSMENT:  Mr. Yasmani Krishna presents to physical therapy with symptoms of R knee and ankle pain after a fall at work. The examination reveals decreased knee and ankle ROM, quad weakness and decreased overall function due to pain. The examination is of low complexity due to a stable clinical presentation. Skilled physical therapy is recommended to progress the plan of care in order for the patient to return to full function with minimal symptoms. PROBLEM LIST (Impacting functional limitations):  1. Decreased Strength  2. Decreased ADL/Functional Activities  3. Increased Pain  4. Decreased Activity Tolerance  5. Decreased Flexibility/Joint Mobility  6. Decreased [place] with Home Exercise Program INTERVENTIONS PLANNED:  1. Cold  2. Heat  3. Home Exercise Program (HEP)  4. Manual Therapy  5. Range of Motion (ROM)  6. Therapeutic Exercise/Strengthening     TREATMENT PLAN:  Effective Dates: 18 TO 10-31-18. Frequency/Duration: 2 times a week for 2 weeks and 1x/wk for the next 2 weeks  GOALS: (Goals have been discussed and agreed upon with patient.)  SHORT-TERM FUNCTIONAL GOALS: Time Frame: 2-4 wks  1. Patient will report 25-50% reduction in overall symptoms  2. Patient will be compliant with HEP and plan of care  3. Patient will have 0-125 deg ROM of the knee  4. Patient will improve swelling by 1 cm on the figure 8 measurement   5. Patient will improve on the LEFS by 5-7 points  DISCHARGE GOALS: Time Frame: 10-12 wk  1. Patient will report % reduction in overall symptoms in order to be able to have full function with decreased symptoms  2. Patient will be able to squat with minimal pain as needed at his job with minimal pain (0-2/10)  3. Patient will be able to ambulate with a symmetrical gait pattern as judged by therapist in order to able to perform activities of daily living  4. Patient will score 60 or greater on the LEFS points in order to demonstrate improved function with less pain    Rehabilitation Potential For Stated Goals: Good    Regarding Trish Alex therapy, I certify that the treatment plan above will be carried out by a therapist or under their direction. Thank you for this referral,  Nataliia Johnson PT,DPT    Referring Physician Signature: SHAWN Montanez _____________________________________________________ Date: __________________________________          HISTORY:   History of Present Injury/Illness (Reason for Referral): Reports that he fell at work on 8-1-18. He had puncture wounds in his lower leg. He had a lot of bleeding during that time requiring stiches in one of the wounds. He had an MRI on his knee revealed a meniscus tear. He went to the medical facility at [address] [place] [address] and they sent him to the ED where he got stiches for his wounds. The next day he went to the work well doctor and was then referred to Dr. Song Tyler. He also had hand tingling afterwards. He was put on a blood thinner to prevent blood clots after having a negative ultrasound. He had a steroid shot in the knee with minimal benefit at this point. No numbness in the foot reported. 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 2 stents, diabetes type II   Social History/Living Environment: lives in a house with family  Prior Level of Function/Work/Activity:independent / works at [employer]  Current Medications:   Aspirin, losartan, clopidogrel, omprezole, atovastatin, metoprolol, metformin, claratan   Date Last Reviewed:  9/17/2018   Number of Personal Factors/Comorbidities that affect the Plan of Care: 0: LOW COMPLEXITY   EXAMINATION:   (Abbreviations: P-pain, NP- no pain, wnl-within normal limits)    Observation/Orthostatic Postural Assessment:    Relaxed standing posture:  Several healing wounds noted on anterior portion of his R lower leg    Palpation/tone/tissue texture:    ASIS: n/a  PSIS:n/a  Leg Length: supine:n/a         Long Sitting: n/a      Soft tissue:  · Hip flexors:n/a  · Hamstrings: increased tightness on R  · IT band: na  · gastroc/soleus/posterior tibialis: increased tightness on R  · Anterior tibialis: Increased tightness on R      Pulses: Dorsal pedis: R: 1+      Posterior tibialis: R 1+    Edema measurements:  Date:  8-31-18      Activity/Exercise Left  Right   Figure 8 56 cm 59 cm   Mid calf 41 cm 42cm       ROM:   Knee ROM Date:  8-31-18       Right Left   Flexion 108 140   Extension 15 0      Ankle ROM Date:  8-31-18       Right Left   DF 0 10   PF n/a wnl   Inversion - -   Eversion - -           Strength:     Hip Strength Date:  8-31-18       Right Left   Flexion 5 5   Extension 4+ 5   IR n/a n/a   ER n/a n/a   Abduction 5 5      Knee Strength Date:  8-31-18       Right Left   Flexion 4 5   Extension 4 5             Special Tests:   Stability tests:  Lachmans: (-)  Slocums:  AM: (-)          AL: (-)  Posterior drawer: (-)  Posterior Sag: (-)    Varus: 0 deg: (-)   30 deg: (-)  Valgus: 0 deg: (-)   30 deg: (+) mild pain on R    Meniscus cluster test  1. End range flexion: -  2. End range extension: + for pain/tightness   3. Joint line tenderness: medical joint line tenderness   4. Report of locking: (-)  5.  McMurreys:     Neurological Screen:   Myotomes: strong in bilateral LE's     Dermatomes: intact in bilateral LE's Reflexes: n/a         Neural Tension Tests: -  Functional Mobility:    · Double leg squat: n/a  · Single leg squat:  L:n/a         R:n/a  · Gait Pattern: ambulates with decreased terminal stance on R   Balance:  Single leg   L: n/a  R:n/a        CLINICAL DECISION MAKING:   Outcome Measure: Tool Used: Lower Extremity Functional Scale (LEFS)  Score:  Initial: 27/80 Most Recent: X/80 (Date: -- )   Interpretation of Score: 20 questions each scored on a 5 point scale with 0 representing \"extreme difficulty or unable to perform\" and 4 representing \"no difficulty\". The lower the score, the greater the functional disability. 80/80 represents no disability. Minimal detectable change is 9 points. Score 80 79-63 62-48 47-32 31-16 15-1 0   Modifier CH CI CJ CK CL CM CN     Medical Necessity:   · Patient is expected to demonstrate progress in strength, range of motion and symptom levels to return to full function. Reason for Services/Other Comments:  · Patient continues to require skilled intervention due to ongoing symptoms. Use of outcome tool(s) and clinical judgement create a POC that gives a: Clear prediction of patient's progress: LOW COMPLEXITY   TREATMENT:   (In addition to Assessment/Re-Assessment sessions the following treatments were rendered)    Subjective Pre-Treatment Symptoms: Reports that he isn't sure if PT will help completely since he was told he had a meniscus tear. Therapeutic Exercise: (15 min) Done in order to improve strength, ROM and understanding of current condition.     Date  9-12-18 Date  9-17-18   Activity/Exercise     Education · Discussed HEP  · Discussed purchasing an ankle brace for stability  ·    Biking 10 min for warm up 10 min for warm up   Taping Taped patella into distraction, 2 I strips used    Heel slides -    ankle PF/DF 20x 20x   SLR SAQ: 10x, 3 sets, 3lbs SAQ: 10x, 3 sets, 3lbs   Side-lying abd -    Gait training discussed    Terminal knee extension  Blue band, 10x, 3 sets    Ankle proprioception Asked to keep in neutral, 5 min    Manual Therapy: (30) Done in order to improve joint and soft tissue mobility,reduce muscle guarding, and decrease muscle tone  · Knee extension mobilization to tibia and femur and with gentle traction, grade III-IV  · Patella mobilization, superior-inferior, medial-lateral, distraction using suction   · Talocrural posterior glide to facilitated DF   · Calcaneal distraction with DF of the ankle  Modalities: (15 min) Done in order to reduce swelling and pain  Ice x 15 min to R knee  Treatment/Session Assessment:  Patient is doing well with current PT program.    · Pain: Initial:   Mild symptoms reported at rest Post Session:  Minimal symptoms after ice ·   Compliance with Program/Exercises: Will assess as treatment progresses. · Recommendations/Intent for next treatment session: \"Next visit will focus on progressing the patients plan of care\".     Future Appointments  Date Time Provider Alan Plunkett   9/19/2018 10:00 AM Sabina Abernathy PT, DPT [facility]   9/26/2018 10:45 AM Sabina Abernathy PT, DPT [facility]   10/3/2018 10:00 AM Sabina Abernathy PT, DPT [facility]     Total Treatment Duration: 60 min       Lex Castleman PT, DPT

## 2018-09-19 ENCOUNTER — HOSPITAL ENCOUNTER (OUTPATIENT)
Dept: PHYSICAL THERAPY | Age: 53
Discharge: HOME | End: 2018-09-19
Payer: COMMERCIAL

## 2018-09-19 PROCEDURE — 97110 THERAPEUTIC EXERCISES: CPT

## 2018-09-19 PROCEDURE — 97140 MANUAL THERAPY 1/> REGIONS: CPT

## 2018-09-19 NOTE — PROGRESS NOTES
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 5 = High Risk)  MD Orders: evaluate and treat MEDICAL/REFERRING DIAGNOSIS:Right ankle sprain,chondromalacia right knee  DATE OF ONSET:   REFERRING PHYSICIAN:Porsha Deluna PA  RETURN PHYSICIAN APPOINTMENT: did not specify       ASSESSMENT:  Mr. Tiara Dale presents to physical therapy with symptoms of R knee and ankle pain after a fall at work. The examination reveals decreased knee and ankle ROM, quad weakness and decreased overall function due to pain. The examination is of low complexity due to a stable clinical presentation. Skilled physical therapy is recommended to progress the plan of care in order for the patient to return to full function with minimal symptoms. PROBLEM LIST (Impacting functional limitations):  1. Decreased Strength  2. Decreased ADL/Functional Activities  3. Increased Pain  4. Decreased Activity Tolerance  5. Decreased Flexibility/Joint Mobility  6. Decreased [place] with Home Exercise Program INTERVENTIONS PLANNED:  1. Cold  2. Heat  3. Home Exercise Program (HEP)  4. Manual Therapy  5. Range of Motion (ROM)  6. Therapeutic Exercise/Strengthening     TREATMENT PLAN:  Effective Dates: 18 TO 10-31-18. Frequency/Duration: 2 times a week for 2 weeks and 1x/wk for the next 2 weeks  GOALS: (Goals have been discussed and agreed upon with patient.)  SHORT-TERM FUNCTIONAL GOALS: Time Frame: 2-4 wks  1. Patient will report 25-50% reduction in overall symptoms  2. Patient will be compliant with HEP and plan of care  3. Patient will have 0-125 deg ROM of the knee  4. Patient will improve swelling by 1 cm on the figure 8 measurement   5. Patient will improve on the LEFS by 5-7 points  DISCHARGE GOALS: Time Frame: 10-12 wk  1. Patient will report % reduction in overall symptoms in order to be able to have full function with decreased symptoms  2. Patient will be able to squat with minimal pain as needed at his job with minimal pain (0-2/10)  3. Patient will be able to ambulate with a symmetrical gait pattern as judged by therapist in order to able to perform activities of daily living  4. Patient will score 60 or greater on the LEFS points in order to demonstrate improved function with less pain    Rehabilitation Potential For Stated Goals: Good    Regarding Thuy Jimenez therapy, I certify that the treatment plan above will be carried out by a therapist or under their direction. Thank you for this referral,  Hannah De Los Santos PT,DPT    Referring Physician Signature: SHAWN Bianchi _____________________________________________________ Date: __________________________________          HISTORY:   History of Present Injury/Illness (Reason for Referral): Reports that he fell at work on 8-1-18. He had puncture wounds in his lower leg. He had a lot of bleeding during that time requiring stiches in one of the wounds. He had an MRI on his knee revealed a meniscus tear. He went to the medical facility at [address] [place] [address] and they sent him to the ED where he got stiches for his wounds. The next day he went to the [facility] well doctor and was then referred to Dr. Xenia Esquivel. He also had hand tingling afterwards. He was put on a blood thinner to prevent blood clots after having a negative ultrasound. He had a steroid shot in the knee with minimal benefit at this point. No numbness in the foot reported. 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 2 stents, diabetes type II   Social History/Living Environment: lives in a house with family  Prior Level of Function/Work/Activity:independent / works at [employer]  Current Medications:   Aspirin, losartan, clopidogrel, omprezole, atovastatin, metoprolol, metformin, claratan   Date Last Reviewed:  9/19/2018   Number of Personal Factors/Comorbidities that affect the Plan of Care: 0: LOW COMPLEXITY   EXAMINATION:   (Abbreviations: P-pain, NP- no pain, wnl-within normal limits)    Observation/Orthostatic Postural Assessment:    Relaxed standing posture:  Several healing wounds noted on anterior portion of his R lower leg    Palpation/tone/tissue texture:    ASIS: n/a  PSIS:n/a  Leg Length: supine:n/a         Long Sitting: n/a      Soft tissue:  · Hip flexors:n/a  · Hamstrings: increased tightness on R  · IT band: na  · gastroc/soleus/posterior tibialis: increased tightness on R  · Anterior tibialis: Increased tightness on R      Pulses: Dorsal pedis: R: 1+      Posterior tibialis: R 1+    Edema measurements:  Date:  8-31-18      Activity/Exercise Left  Right   Figure 8 56 cm 59 cm   Mid calf 41 cm 42cm       ROM:   Knee ROM Date:  8-31-18       Right Left   Flexion 108 140   Extension 15 0      Ankle ROM Date:  8-31-18       Right Left   DF 0 10   PF n/a wnl   Inversion - -   Eversion - -           Strength:     Hip Strength Date:  8-31-18       Right Left   Flexion 5 5   Extension 4+ 5   IR n/a n/a   ER n/a n/a   Abduction 5 5      Knee Strength Date:  8-31-18       Right Left   Flexion 4 5   Extension 4 5             Special Tests:   Stability tests:  Lachmans: (-)  Slocums:  AM: (-)          AL: (-)  Posterior drawer: (-)  Posterior Sag: (-)    Varus: 0 deg: (-)   30 deg: (-)  Valgus: 0 deg: (-)   30 deg: (+) mild pain on R    Meniscus cluster test  1. End range flexion: -  2. End range extension: + for pain/tightness   3. Joint line tenderness: medical joint line tenderness   4. Report of locking: (-)  5.  McMurreys:     Neurological Screen:   Myotomes: strong in bilateral LE's     Dermatomes: intact in bilateral LE's Reflexes: n/a         Neural Tension Tests: -  Functional Mobility:    · Double leg squat: n/a  · Single leg squat:  L:n/a         R:n/a  · Gait Pattern: ambulates with decreased terminal stance on R   Balance:  Single leg   L: n/a  R:n/a        CLINICAL DECISION MAKING:   Outcome Measure: Tool Used: Lower Extremity Functional Scale (LEFS)  Score:  Initial: 27/80 Most Recent: X/80 (Date: -- )   Interpretation of Score: 20 questions each scored on a 5 point scale with 0 representing \"extreme difficulty or unable to perform\" and 4 representing \"no difficulty\". The lower the score, the greater the functional disability. 80/80 represents no disability. Minimal detectable change is 9 points. Score 80 79-63 62-48 47-32 31-16 15-1 0   Modifier CH CI CJ CK CL CM CN     Medical Necessity:   · Patient is expected to demonstrate progress in strength, range of motion and symptom levels to return to full function. Reason for Services/Other Comments:  · Patient continues to require skilled intervention due to ongoing symptoms. Use of outcome tool(s) and clinical judgement create a POC that gives a: Clear prediction of patient's progress: LOW COMPLEXITY   TREATMENT:   (In addition to Assessment/Re-Assessment sessions the following treatments were rendered)    Subjective Pre-Treatment Symptoms: Reports that he is doing okay. States he was sore last week from the session. Therapeutic Exercise: (15 min) Done in order to improve strength, ROM and understanding of current condition.     Date  9-12-18 Date  9-17-18 Date  9-19-18     Activity/Exercise      Education · Discussed HEP  · Discussed purchasing an ankle brace for stability  ·  · Discussed HEP   Biking 10 min for warm up 10 min for warm up 5 min warm up   Taping Taped patella into distraction, 2 I strips used  -   Heel slides -  HEP    ankle PF/DF 20x 20x    SLR SAQ: 10x, 3 sets, 3lbs SAQ: 10x, 3 sets, 3lbs SAQ: 20x, 2 sets, 5lbs   Side-lying abd -  -   Gait training discussed  discussed   Terminal knee extension  Blue band, 10x, 3 sets  Terminal knee extension    Ankle proprioception Asked to keep in neutral, 5 min  Wobble board, kept in neutral   Calf stretch   30 sec,2x   Manual Therapy: (30) Done in order to improve joint and soft tissue mobility,reduce muscle guarding, and decrease muscle tone  · Knee extension mobilization to tibia and femur and with gentle traction, grade III-IV  · Patella mobilization, superior-inferior, medial-lateral, distraction using suction   · Talocrural posterior-medial glide to facilitated DF    · Calcaneal distraction with DF of the ankle  Modalities: (15 min) Done in order to reduce swelling and pain  Ice x 15 min to R knee  Treatment/Session Assessment:  Patient is doing pretty good. Continuing to work on DF mobility and knee extension. Working gradually on strengthening in a pain free manner. · Pain: Initial:   Mild symptoms reported at rest Post Session:  Minimal symptoms after ice ·   Compliance with Program/Exercises: Will assess as treatment progresses. · Recommendations/Intent for next treatment session: \"Next visit will focus on progressing the patients plan of care\".     Future Appointments  Date Time Provider Alan Plunkett   9/26/2018 10:45 AM Vivian Bar PT, DPT [facility]   10/3/2018 10:00 AM Vivian Bar PT, DPT [facility]     Total Treatment Duration: 60 min  PT Patient Time In/Time Out  Time In: 1000  Time Out: 800 Enrike Mota PT, DPT

## 2018-09-26 ENCOUNTER — HOSPITAL ENCOUNTER (OUTPATIENT)
Dept: PHYSICAL THERAPY | Age: 53
Discharge: HOME | End: 2018-09-26
Payer: COMMERCIAL

## 2018-09-26 PROCEDURE — 97110 THERAPEUTIC EXERCISES: CPT

## 2018-09-26 PROCEDURE — 97140 MANUAL THERAPY 1/> REGIONS: CPT

## 2018-09-26 NOTE — THERAPY RECERTIFICATION
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 5 = High Risk) MD Orders: evaluate and treat MEDICAL/REFERRING DIAGNOSIS:Right ankle sprain,chondromalacia right knee DATE OF ONSET:  REFERRING PHYSICIAN:Shantell Deluna PA 
RETURN PHYSICIAN APPOINTMENT: did not specify ASSESSMENT:  Mr. Indira Leon has come for a total of 7 visits since starting physical therapy on 18. During that time he reports a 50-60% overall improvement and displays objective improvements with knee and ankle ROM, decreased edema around the ankle and calf and overall better function. He still cannot tolerate higher level weightbearing exercises such as squats and step ups but continues to progress towards that. Skilled physical therapy is recommended to continue for 2x/wk for 4 wks in order to get him to a high level of function with minimal symptoms. PROBLEM LIST (Impacting functional limitations): 1. Decreased Strength 2. Decreased ADL/Functional Activities 3. Increased Pain 4. Decreased Activity Tolerance 5. Decreased Flexibility/Joint Mobility 6. Decreased [place] with Home Exercise Program INTERVENTIONS PLANNED: 
1. Cold 2. Heat 3. Home Exercise Program (HEP) 4. Manual Therapy 5. Range of Motion (ROM) 6. Therapeutic Exercise/Strengthening TREATMENT PLAN: 
Effective Dates: 18 TO 10-26-18 Frequency/Duration: 2 times a week for 4 wks GOALS: (Goals have been discussed and agreed upon with patient.) SHORT-TERM FUNCTIONAL GOALS: Time Frame: 2-4 wks 1. Patient will report 25-50% reduction in overall symptoms (MET) 2.  Patient will be compliant with HEP and plan of care (MET) 3. Patient will have 0-125 deg ROM of the knee (MET) 4. Patient will improve swelling by 1 cm on the figure 8 measurement  (MET) 5. Patient will improve on the LEFS by 5-7 points (MET) DISCHARGE GOALS: Time Frame: 10-12 wk 1. Patient will report % reduction in overall symptoms in order to be able to have full function with decreased symptoms (ongoing) 2. Patient will be able to squat with minimal pain as needed at his job with minimal pain (0-2/10) (ongoing) 3. Patient will be able to ambulate with a symmetrical gait pattern as judged by therapist in order to able to perform activities of daily living (ongoing) 4. Patient will score 60 or greater on the LEFS points in order to demonstrate improved function with less pain Rehabilitation Potential For Stated Goals: Good Regarding Amarilys Damon therapy, I certify that the treatment plan above will be carried out by a therapist or under their direction. Thank you for this referral, Kelly Rueda PT,DPT Referring Physician Signature: SHAWN Bernard _____________________________________________________ Date: __________________________________ HISTORY:  
History of Present Injury/Illness (Reason for Referral): Reports that he fell at work on 8-1-18. He had puncture wounds in his lower leg. He had a lot of bleeding during that time requiring stiches in one of the wounds. He had an MRI on his knee revealed a meniscus tear. He went to the medical facility at [address] [place] [address] & [facility] and they sent him to the ED where he got stiches for his wounds. The next day he went to the [facility] well doctor and was then referred to Dr. Gilberto Dnag. He also had hand tingling afterwards. He was put on a blood thinner to prevent blood clots after having a negative ultrasound. He had a steroid shot in the knee with minimal benefit at this point. No numbness in the foot reported. 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 2 stents, diabetes type II Social History/Living Environment: lives in a house with family Prior Level of Function/Work/Activity:independent / works at [employer] 
Current Medications:   Aspirin, losartan, clopidogrel, omprezole, atovastatin, metoprolol, metformin, claratan Date Last Reviewed:  9/28/2018 Number of Personal Factors/Comorbidities that affect the Plan of Care: 0: LOW COMPLEXITY EXAMINATION:  
(Abbreviations: P-pain, NP- no pain, wnl-within normal limits) Observation/Orthostatic Postural Assessment:   
Relaxed standing posture: 
Present in an ankle brace Palpation/tone/tissue texture:   
ASIS: n/a 
PSIS:n/a 
Leg Length: supine:n/a         Long Sitting: n/a Soft tissue: · Hip flexors:n/a 
· Hamstrings: increased tightness on R 
· IT band: na 
· gastroc/soleus/posterior tibialis: increased tightness on R (improving) · Anterior tibialis: Increased tightness on R   (improving) Edema measurements:  Date: 
9-26-18 Activity/Exercise Left  Right Figure 8 54 cm 56 cm Mid calf 41 cm 41cm ROM:  
Knee ROM Date: 
9-26-18 Right Left Flexion 130 (was 108 deg) 140 Extension 0 deg (was 15 deg) 0 Ankle ROM Date: 
9-26-18 Right Left DF 10 10 PF wnl wnl Inversion - - Eversion - - Strength:  
 
Hip Strength Date: 
9-26-18 Right Left Flexion 5 5 Extension 4+ 5 IR n/a n/a  
ER n/a n/a Abduction 5 5 Knee Strength Date: 
9-26-18 Right Left Flexion 4+ 5 Extension 4 5 Special Tests:  
Stability tests: 
Lachmans: (-) Slocums:  AM: (-)          AL: (-) Posterior drawer: (-) Posterior Sag: (-) Varus: 0 deg: n/a today   30 deg: n/a today Valgus: 0 deg: n/a today   30 deg: n/a today Meniscus cluster test 
1. End range flexion: - 
2.  End range extension: (-) 
 3. Joint line tenderness: medical joint line tenderness  (mild) 4. Report of locking: (-) 
5. McMurreys: n/a Neurological Screen: Myotomes: strong in bilateral LE's Dermatomes: intact in bilateral LE's Reflexes: n/a Neural Tension Tests: - Functional Mobility: · Double leg squat: able to do a function squat · Single leg squat:  L:n/a         R:n/a 
· Gait Pattern: ambulates with decreased terminal stance on R  (improved) Balance:  Single leg L: 10 sec, minimal sway R:3 sec, moderate sway CLINICAL DECISION MAKING:  
Outcome Measure: Tool Used: Lower Extremity Functional Scale (LEFS) Score:  Initial: 27/80 Most Recent: 43/80 (Date: 9-26-18 ) Interpretation of Score: 20 questions each scored on a 5 point scale with 0 representing \"extreme difficulty or unable to perform\" and 4 representing \"no difficulty\". The lower the score, the greater the functional disability. 80/80 represents no disability. Minimal detectable change is 9 points. Score 80 79-63 62-48 47-32 31-16 15-1 0 Modifier CH CI CJ CK CL CM CN Medical Necessity:  
· Patient is expected to demonstrate progress in strength, range of motion and symptom levels to return to full function. Reason for Services/Other Comments: 
· Patient continues to require skilled intervention due to ongoing symptoms. TREATMENT:  
(In addition to Assessment/Re-Assessment sessions the following treatments were rendered) Subjective Pre-Treatment Symptoms: Reports that he is doing pretty good. Overall 50-60% improved. Still has some lateral ankle pain and knee irritation. Therapeutic Exercise: (15 min) Done in order to improve strength, ROM and understanding of current condition. Date 6-68-19 Date 9-19-18 Date 9-26-18 Activity/Exercise Education ·  · Discussed HEP · Discussed HEP Biking 10 min for warm up 5 min warm up 10 min warm up Taping  - -  
ankle PF/DF 20x SLR SAQ: 10x, 3 sets, 3lbs SAQ: 20x, 2 sets, 5lbs SAQ, 10lbs, 10x, 3 sets Side-lying abd  - -  
Gait training  discussed Discussed working on terminal stance Terminal knee extension   Terminal knee extension  With SL balance, ipsilateral support as needed, 5 sec, 10x, 2 sets, green band resistance Ankle proprioception  Wobble board, kept in neutral -  
Calf stretch  30 sec,2x 30 sec, 3 sets Manual Therapy: (30) Done in order to improve joint and soft tissue mobility,reduce muscle guarding, and decrease muscle tone · Knee extension mobilization to tibia and femur and with gentle traction, grade III-IV · Patella mobilization, superior-inferior, medial-lateral, distraction using suction · Talocrural posterior-medial glide to facilitated DF · Calcaneal distraction with DF of the ankle Modalities: (10 min) Done in order to reduce swelling and pain Ice x 10 min to R knee Treatment/Session Assessment: Mr. Mateo Bermudez tolerated the session well. His ankle and knee ROM continue to improve. We began working single leg stance balance and stability exercises which he had slight pain with. Discussed his home program and plan of care. · Pain: Initial:   Mild symptoms reported at rest Post Session:  Minimal symptoms after ice · Compliance with Program/Exercises: compliant · Recommendations/Intent for next treatment session: \"Next visit will focus on progressing the patients plan of care\". Future Appointments Date Time Provider Alan Plunkett 10/3/2018 10:00 AM Quintin Tapia, PT, DPT [facility] Total Treatment Duration: 55 min, re-eval 5 min PT Patient Time In/Time Out Time In: 6694 Time Out: 1145 Tara Manning PT, DPT

## 2018-10-03 ENCOUNTER — APPOINTMENT (OUTPATIENT)
Dept: PHYSICAL THERAPY | Age: 53
End: 2018-10-03
Payer: COMMERCIAL

## 2018-10-05 ENCOUNTER — APPOINTMENT (OUTPATIENT)
Dept: PHYSICAL THERAPY | Age: 53
End: 2018-10-05
Payer: COMMERCIAL

## 2018-10-10 ENCOUNTER — APPOINTMENT (OUTPATIENT)
Dept: PHYSICAL THERAPY | Age: 53
End: 2018-10-10
Payer: COMMERCIAL

## 2018-10-12 ENCOUNTER — HOSPITAL ENCOUNTER (OUTPATIENT)
Dept: PHYSICAL THERAPY | Age: 53
Discharge: HOME | End: 2018-10-12
Payer: COMMERCIAL

## 2018-10-12 ENCOUNTER — APPOINTMENT (OUTPATIENT)
Dept: PHYSICAL THERAPY | Age: 53
End: 2018-10-12
Payer: COMMERCIAL

## 2018-10-12 PROCEDURE — 97110 THERAPEUTIC EXERCISES: CPT

## 2018-10-12 PROCEDURE — 97140 MANUAL THERAPY 1/> REGIONS: CPT

## 2018-10-12 NOTE — PROGRESS NOTES
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 5 = High Risk)  MD Orders: evaluate and treat MEDICAL/REFERRING DIAGNOSIS:Right ankle sprain,chondromalacia right knee  DATE OF ONSET:   REFERRING PHYSICIAN:Loren Deluna PA  RETURN PHYSICIAN APPOINTMENT: did not specify       ASSESSMENT:  Mr. Clarice Silva has come for a total of 7 visits since starting physical therapy on 18. During that time he reports a 50-60% overall improvement and displays objective improvements with knee and ankle ROM, decreased edema around the ankle and calf and overall better function. He still cannot tolerate higher level weightbearing exercises such as squats and step ups but continues to progress towards that. Skilled physical therapy is recommended to continue for 2x/wk for 4 wks in order to get him to a high level of function with minimal symptoms. PROBLEM LIST (Impacting functional limitations):  1. Decreased Strength  2. Decreased ADL/Functional Activities  3. Increased Pain  4. Decreased Activity Tolerance  5. Decreased Flexibility/Joint Mobility  6. Decreased [place] with Home Exercise Program INTERVENTIONS PLANNED:  1. Cold  2. Heat  3. Home Exercise Program (HEP)  4. Manual Therapy  5. Range of Motion (ROM)  6. Therapeutic Exercise/Strengthening     TREATMENT PLAN:  Effective Dates: 18 TO 10-26-18  Frequency/Duration: 2 times a week for 4 wks  GOALS: (Goals have been discussed and agreed upon with patient.)  SHORT-TERM FUNCTIONAL GOALS: Time Frame: 2-4 wks  1. Patient will report 25-50% reduction in overall symptoms (MET)  2.   Patient will be compliant with HEP and plan of care (MET)  3. Patient will have 0-125 deg ROM of the knee (MET)   4. Patient will improve swelling by 1 cm on the figure 8 measurement  (MET)  5. Patient will improve on the LEFS by 5-7 points (MET)  DISCHARGE GOALS: Time Frame: 10-12 wk  1. Patient will report % reduction in overall symptoms in order to be able to have full function with decreased symptoms (ongoing)  2. Patient will be able to squat with minimal pain as needed at his job with minimal pain (0-2/10) (ongoing)   3. Patient will be able to ambulate with a symmetrical gait pattern as judged by therapist in order to able to perform activities of daily living (ongoing)  4. Patient will score 60 or greater on the LEFS points in order to demonstrate improved function with less pain    Rehabilitation Potential For Stated Goals: Good    Regarding Shemar Lyons therapy, I certify that the treatment plan above will be carried out by a therapist or under their direction. Thank you for this referral,  Shirlene Montenegro PT,DPT    Referring Physician Signature: SHAWN Mejia _____________________________________________________ Date: __________________________________          HISTORY:   History of Present Injury/Illness (Reason for Referral): Reports that he fell at work on 8-1-18. He had puncture wounds in his lower leg. He had a lot of bleeding during that time requiring stiches in one of the wounds. He had an MRI on his knee revealed a meniscus tear. He went to the medical facility at [address] [place] [address] & \[facility] and they sent him to the ED where he got stiches for his wounds. The next day he went to the work well doctor and was then referred to Dr. Rina Sousa. He also had hand tingling afterwards. He was put on a blood thinner to prevent blood clots after having a negative ultrasound. He had a steroid shot in the knee with minimal benefit at this point. No numbness in the foot reported.   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 2 stents, diabetes type II   Social History/Living Environment: lives in a house with family  Prior Level of Function/Work/Activity:independent / works at [employer]  Current Medications:   Aspirin, losartan, clopidogrel, omprezole, atovastatin, metoprolol, metformin, claratan   Date Last Reviewed:  10/12/2018   Number of Personal Factors/Comorbidities that affect the Plan of Care: 0: LOW COMPLEXITY   EXAMINATION:   (Abbreviations: P-pain, NP- no pain, wnl-within normal limits)    Observation/Orthostatic Postural Assessment:    Relaxed standing posture:  Present in an ankle brace     Palpation/tone/tissue texture:    ASIS: n/a  PSIS:n/a  Leg Length: supine:n/a         Long Sitting: n/a      Soft tissue:  · Hip flexors:n/a  · Hamstrings: increased tightness on R  · IT band: na  · gastroc/soleus/posterior tibialis: increased tightness on R (improving)  · Anterior tibialis: Increased tightness on R   (improving)      Edema measurements:  Date:  9-26-18      Activity/Exercise Left  Right   Figure 8 54 cm 56 cm   Mid calf 41 cm 41cm       ROM:   Knee ROM Date:  9-26-18       Right Left   Flexion 130 (was 108 deg) 140   Extension 0 deg (was 15 deg) 0      Ankle ROM Date:  9-26-18       Right Left   DF 10 10   PF wnl wnl   Inversion - -   Eversion - -           Strength:     Hip Strength Date:  9-26-18       Right Left   Flexion 5 5   Extension 4+ 5   IR n/a n/a   ER n/a n/a   Abduction 5 5      Knee Strength Date:  9-26-18       Right Left   Flexion 4+ 5   Extension 4 5             Special Tests:   Stability tests:  Lachmans: (-)  Slocums:  AM: (-)          AL: (-)  Posterior drawer: (-)  Posterior Sag: (-)    Varus: 0 deg: n/a today   30 deg: n/a today  Valgus: 0 deg: n/a today   30 deg: n/a today    Meniscus cluster test  1. End range flexion: -  2. End range extension: (-)  3. Joint line tenderness: medical joint line tenderness  (mild)  4. Report of locking: (-)  5.  McMgabrielleeys: n/a    Neurological Screen:   Myotomes: strong in bilateral LE's     Dermatomes: intact in bilateral LE's         Reflexes: n/a         Neural Tension Tests: -  Functional Mobility:    · Double leg squat: able to do a function squat  · Single leg squat:  L:n/a         R:n/a  · Gait Pattern: ambulates with decreased terminal stance on R  (improved)  Balance:  Single leg   L: 10 sec, minimal sway  R:3 sec, moderate sway        CLINICAL DECISION MAKING:   Outcome Measure: Tool Used: Lower Extremity Functional Scale (LEFS)  Score:  Initial: 27/80 Most Recent: 43/80 (Date: 9-26-18 )   Interpretation of Score: 20 questions each scored on a 5 point scale with 0 representing \"extreme difficulty or unable to perform\" and 4 representing \"no difficulty\". The lower the score, the greater the functional disability. 80/80 represents no disability. Minimal detectable change is 9 points. Score 80 79-63 62-48 47-32 31-16 15-1 0   Modifier CH CI CJ CK CL CM CN     Medical Necessity:   · Patient is expected to demonstrate progress in strength, range of motion and symptom levels to return to full function. Reason for Services/Other Comments:  · Patient continues to require skilled intervention due to ongoing symptoms. TREATMENT:   (In addition to Assessment/Re-Assessment sessions the following treatments were rendered)    Subjective Pre-Treatment Symptoms: Reports that he is doing relatively well. Felt the ankle pop several wks ago which actually made it feel some better. Therapeutic Exercise: (30min) Done in order to improve strength, ROM and understanding of current condition.     Date  9-26-18 Date  10-12-18   Activity/Exercise     Education · Discussed HEP · Discussed HEP   Biking 10 min warm up 10 min warm up   Taping -    ankle PF/DF  DF end range control: 10 sec hold, 5x   SLR SAQ, 10lbs, 10x, 3 sets -   Side-lying abd -    Gait training Discussed working on terminal stance discussed   Terminal knee extension w/ step up With SL balance, ipsilateral support as needed, 5 sec, 10x, 2 sets, green band resistance With SL balance, ipsilateral support as needed, 5 sec, 10x, 2 sets-4\"   10-6\"   Ankle proprioception - SL balance w/ contralateral leg multi direction, 10x,2 sets   Calf stretch 30 sec, 3 sets 30 sec, 2x   Quad stretch  60 sec   Manual Therapy: (20) Done in order to improve joint and soft tissue mobility,reduce muscle guarding, and decrease muscle tone  · Knee extension mobilization to tibia and femur and with gentle traction, grade III-IV  · Patella mobilization, superior-inferior, medial-lateral, distraction using suction   · Quad tendon and muscle elongation mobilization   · Talocrural posterior-medial glide to facilitated DF    · Calcaneal distraction with DF of the ankle  Modalities: (10 min) Done in order to reduce swelling and pain  Ice x 10 min to R knee  Treatment/Session Assessment: Mr. Ilir Phan tolerated the session well. His knee ROM has progressed well despite not having PT, however, his ankle is still stiff. Improves after manual treatment. Started working more on ankle proprioception. · Pain: Initial:   Mild symptoms reported at rest Post Session:  Minimal symptoms after ice ·   Compliance with Program/Exercises: compliant   · Recommendations/Intent for next treatment session: \"Next visit will focus on progressing the patients plan of care\".     Future Appointments  Date Time Provider Alan Plunkett   10/19/2018 1:45 PM [facility]   10/22/2018 10:00 AM Santo Hernandez PT, DPT [facility]   10/24/2018 10:00 AM Santo Hernandez PT, DPT [facility]   10/29/2018 10:00 AM Santo Hernandez PT, RUPERTO AVINA [facility]   10/31/2018 10:00 AM Santo Hernandez PT, DPT [facility]   11/5/2018 10:00 AM Santo Hernandez PT, RUPERTO [facility]   11/7/2018 10:00 AM Santo Hernandez PT, RUPERTO [facility]   11/12/2018 10:00 AM Santo Hernandez PT, DPT [facility]     Total Treatment Duration: 60 min  PT Patient Time In/Time Out  Time In: 1550  Time Out: [id number] PT, DPT

## 2018-10-17 ENCOUNTER — APPOINTMENT (OUTPATIENT)
Dept: PHYSICAL THERAPY | Age: 53
End: 2018-10-17
Payer: COMMERCIAL

## 2018-10-19 ENCOUNTER — HOSPITAL ENCOUNTER (OUTPATIENT)
Dept: PHYSICAL THERAPY | Age: 53
Discharge: HOME | End: 2018-10-19
Payer: COMMERCIAL

## 2018-10-19 PROCEDURE — 97110 THERAPEUTIC EXERCISES: CPT

## 2018-10-19 PROCEDURE — 97140 MANUAL THERAPY 1/> REGIONS: CPT

## 2018-10-19 NOTE — PROGRESS NOTES
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 5 = High Risk)  MD Orders: evaluate and treat MEDICAL/REFERRING DIAGNOSIS:Right ankle sprain,chondromalacia right knee  DATE OF ONSET:   REFERRING PHYSICIAN:Tommie Deluna PA  RETURN PHYSICIAN APPOINTMENT: did not specify       ASSESSMENT:  Mr. Antony Haque has come for a total of 7 visits since starting physical therapy on 18. During that time he reports a 50-60% overall improvement and displays objective improvements with knee and ankle ROM, decreased edema around the ankle and calf and overall better function. He still cannot tolerate higher level weightbearing exercises such as squats and step ups but continues to progress towards that. Skilled physical therapy is recommended to continue for 2x/wk for 4 wks in order to get him to a high level of function with minimal symptoms. PROBLEM LIST (Impacting functional limitations):  1. Decreased Strength  2. Decreased ADL/Functional Activities  3. Increased Pain  4. Decreased Activity Tolerance  5. Decreased Flexibility/Joint Mobility  6. Decreased [place] with Home Exercise Program INTERVENTIONS PLANNED:  1. Cold  2. Heat  3. Home Exercise Program (HEP)  4. Manual Therapy  5. Range of Motion (ROM)  6. Therapeutic Exercise/Strengthening     TREATMENT PLAN:  Effective Dates: 18 TO 10-26-18  Frequency/Duration: 2 times a week for 4 wks  GOALS: (Goals have been discussed and agreed upon with patient.)  SHORT-TERM FUNCTIONAL GOALS: Time Frame: 2-4 wks  1. Patient will report 25-50% reduction in overall symptoms (MET)  2.   Patient will be compliant with HEP and plan of care (MET)  3. Patient will have 0-125 deg ROM of the knee (MET)   4. Patient will improve swelling by 1 cm on the figure 8 measurement  (MET)  5. Patient will improve on the LEFS by 5-7 points (MET)  DISCHARGE GOALS: Time Frame: 10-12 wk  1. Patient will report % reduction in overall symptoms in order to be able to have full function with decreased symptoms (ongoing)  2. Patient will be able to squat with minimal pain as needed at his job with minimal pain (0-2/10) (ongoing)   3. Patient will be able to ambulate with a symmetrical gait pattern as judged by therapist in order to able to perform activities of daily living (ongoing)  4. Patient will score 60 or greater on the LEFS points in order to demonstrate improved function with less pain    Rehabilitation Potential For Stated Goals: Good    Regarding [facility] therapy, I certify that the treatment plan above will be carried out by a therapist or under their direction. Thank you for this referral,  Iona Weller PT,DPT    Referring Physician Signature: Junie Kanner, PA _____________________________________________________ Date: __________________________________          HISTORY:   History of Present Injury/Illness (Reason for Referral): Reports that he fell at work on 8-1-18. He had puncture wounds in his lower leg. He had a lot of bleeding during that time requiring stiches in one of the wounds. He had an MRI on his knee revealed a meniscus tear. [facility] [address] [place] [address] [facility] ED where he got stiches for his wounds. The next day he went to the work well doctor and was then referred to Dr. Gracy Jesus. He also had hand tingling afterwards. He was put on a blood thinner to prevent blood clots after having a negative ultrasound. He had a steroid shot in the knee with minimal benefit at this point. No numbness in the foot reported.   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 2 stents, diabetes type II   Social History/Living Environment: lives in a house with family  Prior Level of Function/Work/Activity:independent / works at [employer]  Current Medications:   Aspirin, losartan, clopidogrel, omprezole, atovastatin, metoprolol, metformin, claratan   Date Last Reviewed:  10/21/2018   Number of Personal Factors/Comorbidities that affect the Plan of Care: 0: LOW COMPLEXITY   EXAMINATION:   (Abbreviations: P-pain, NP- no pain, wnl-within normal limits)    Observation/Orthostatic Postural Assessment:    Relaxed standing posture:  Present in an ankle brace     Palpation/tone/tissue texture:    ASIS: n/a  PSIS:n/a  Leg Length: supine:n/a         Long Sitting: n/a      Soft tissue:  · Hip flexors:n/a  · Hamstrings: increased tightness on R  · IT band: na  · gastroc/soleus/posterior tibialis: increased tightness on R (improving)  · Anterior tibialis: Increased tightness on R   (improving)      Edema measurements:  Date:  9-26-18      Activity/Exercise Left  Right   Figure 8 54 cm 56 cm   Mid calf 41 cm 41cm       ROM:   Knee ROM Date:  9-26-18       Right Left   Flexion 130 (was 108 deg) 140   Extension 0 deg (was 15 deg) 0      Ankle ROM Date:  9-26-18       Right Left   DF 10 10   PF wnl wnl   Inversion - -   Eversion - -           Strength:     Hip Strength Date:  9-26-18       Right Left   Flexion 5 5   Extension 4+ 5   IR n/a n/a   ER n/a n/a   Abduction 5 5      Knee Strength Date:  9-26-18       Right Left   Flexion 4+ 5   Extension 4 5             Special Tests:   Stability tests:  Lachmans: (-)  Slocums:  AM: (-)          AL: (-)  Posterior drawer: (-)  Posterior Sag: (-)    Varus: 0 deg: n/a today   30 deg: n/a today  Valgus: 0 deg: n/a today   30 deg: n/a today    Meniscus cluster test  1. End range flexion: -  2. End range extension: (-)  3. Joint line tenderness: medical joint line tenderness  (mild)  4. Report of locking: (-)  5.  McMgabrielleeys: n/a    Neurological Screen:   Myotomes: strong in bilateral LE's     Dermatomes: intact in bilateral LE's         Reflexes: n/a         Neural Tension Tests: -  Functional Mobility:    · Double leg squat: able to do a function squat  · Single leg squat:  L:n/a         R:n/a  · Gait Pattern: ambulates with decreased terminal stance on R  (improved)  Balance:  Single leg   L: 10 sec, minimal sway  R:3 sec, moderate sway        CLINICAL DECISION MAKING:   Outcome Measure: Tool Used: Lower Extremity Functional Scale (LEFS)  Score:  Initial: 27/80 Most Recent: 43/80 (Date: 9-26-18 )   Interpretation of Score: 20 questions each scored on a 5 point scale with 0 representing \"extreme difficulty or unable to perform\" and 4 representing \"no difficulty\". The lower the score, the greater the functional disability. 80/80 represents no disability. Minimal detectable change is 9 points. Score 80 79-63 62-48 47-32 31-16 15-1 0   Modifier CH CI CJ CK CL CM CN     Medical Necessity:   · Patient is expected to demonstrate progress in strength, range of motion and symptom levels to return to full function. Reason for Services/Other Comments:  · Patient continues to require skilled intervention due to ongoing symptoms. TREATMENT:   (In addition to Assessment/Re-Assessment sessions the following treatments were rendered)    Subjective Pre-Treatment Symptoms: Reports that he is doing pretty good. Ankle is feeling better; however, the knee still feels sore in the front     Therapeutic Exercise: (30min) Done in order to improve strength, ROM and understanding of current condition.     Date  9-26-18 Date  10-12-18 Date  10-19-18   Activity/Exercise      Education · Discussed HEP · Discussed HEP Discussed HEP   Biking 10 min warm up 10 min warm up 10 min warm up   Taping -     ankle PF/DF  DF end range control: 10 sec hold, 5x -   Side-lying abd -     Gait training Discussed working on terminal stance discussed discused   Terminal knee extension w/ step up With SL balance, ipsilateral support as needed, 5 sec, 10x, 2 sets, green band resistance With SL balance, ipsilateral support as needed, 5 sec, 10x, 2 sets-4\"   10-6\" With SL balance, ipsilateral support as needed, 5 sec, 10x, 2 sets-4\"      Ankle proprioception - SL balance w/ contralateral leg multi direction, 10x,2 sets SL balance w/ contralateral leg multi direction, 10x,2 sets, stable and unstable surface   Calf stretch 30 sec, 3 sets 30 sec, 2x 30 sec, 2 sets   Quad stretch  60 sec 60 sec   Manual Therapy: (20) Done in order to improve joint and soft tissue mobility,reduce muscle guarding, and decrease muscle tone  · Knee extension mobilization to tibia and femur and with gentle traction, grade III-IV  · Patella mobilization, superior-inferior, medial-lateral, distraction using suction   · Quad tendon and muscle elongation mobilization   · Talocrural posterior-medial glide to facilitated DF, in supine and weight bearing     · Calcaneal distraction with DF of the ankle  Modalities: (10 min) Done in order to reduce swelling and pain  Ice x 10 min to R knee  Treatment/Session Assessment: Mr. Comfort Alexander tolerated the session well. His knee and ankle ROM have been improving. Still unable to tolerate squatting. Gait is improving. Discussed HEP. · Pain: Initial:   Mild symptoms reported at rest Post Session:  Minimal symptoms after ice ·   Compliance with Program/Exercises: compliant   · Recommendations/Intent for next treatment session: \"Next visit will focus on progressing the patients plan of care\".     Future Appointments   Date Time Provider Alan Plunkett   10/22/2018 10:00 AM Lili Zendejas PT, DPT [facility]   10/24/2018 10:00 AM Lili Zendejas PT, DPT [facility]   10/29/2018 10:00 AM Lili Zendejas PT, DPUZMA [facility]   10/31/2018 10:00 AM Lili Zendejas PT, [facility]PUZMA [facility]   11/5/2018 10:00 AM Lili Zendejas PT, DPT [facility]   11/7/2018 10:00 AM Nora Mariscal, PT[facility]   11/12/2018 10:00 AM N[facility]iscal PT[facility]     Total Treatment Duration: 60 min  PT Patient Time In/Time Out  Time In: 1345  Time Out: [address] PT, DPT

## 2018-10-22 ENCOUNTER — HOSPITAL ENCOUNTER (OUTPATIENT)
Dept: PHYSICAL THERAPY | Age: 53
Discharge: HOME | End: 2018-10-22
Payer: COMMERCIAL

## 2018-10-22 PROCEDURE — 97140 MANUAL THERAPY 1/> REGIONS: CPT

## 2018-10-22 PROCEDURE — 97110 THERAPEUTIC EXERCISES: CPT

## 2018-10-22 NOTE — PROGRESS NOTES
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 5 = High Risk)  MD Orders: evaluate and treat MEDICAL/REFERRING DIAGNOSIS:Right ankle sprain,chondromalacia right knee  DATE OF ONSET:   REFERRING PHYSICIAN:Linnette Deluna PA  RETURN PHYSICIAN APPOINTMENT: did not specify       ASSESSMENT:  Mr. Ramirez Powell has come for a total of 7 visits since starting physical therapy on 18. During that time he reports a 50-60% overall improvement and displays objective improvements with knee and ankle ROM, decreased edema around the ankle and calf and overall better function. He still cannot tolerate higher level weightbearing exercises such as squats and step ups but continues to progress towards that. Skilled physical therapy is recommended to continue for 2x/wk for 4 wks in order to get him to a high level of function with minimal symptoms. PROBLEM LIST (Impacting functional limitations):  1. Decreased Strength  2. Decreased ADL/Functional Activities  3. Increased Pain  4. Decreased Activity Tolerance  5. Decreased Flexibility/Joint Mobility  6. Decreased [place] with Home Exercise Program INTERVENTIONS PLANNED:  1. Cold  2. Heat  3. Home Exercise Program (HEP)  4. Manual Therapy  5. Range of Motion (ROM)  6. Therapeutic Exercise/Strengthening     TREATMENT PLAN:  Effective Dates: 18 TO 10-26-18  Frequency/Duration: 2 times a week for 4 wks  GOALS: (Goals have been discussed and agreed upon with patient.)  SHORT-TERM FUNCTIONAL GOALS: Time Frame: 2-4 wks  1. Patient will report 25-50% reduction in overall symptoms (MET)  2.   Patient will be compliant with HEP and plan of care (MET)  3. Patient will have 0-125 deg ROM of the knee (MET)   4. Patient will improve swelling by 1 cm on the figure 8 measurement  (MET)  5. Patient will improve on the LEFS by 5-7 points (MET)  DISCHARGE GOALS: Time Frame: 10-12 wk  1. Patient will report % reduction in overall symptoms in order to be able to have full function with decreased symptoms (ongoing)  2. Patient will be able to squat with minimal pain as needed at his job with minimal pain (0-2/10) (ongoing)   3. Patient will be able to ambulate with a symmetrical gait pattern as judged by therapist in order to able to perform activities of daily living (ongoing)  4. Patient will score 60 or greater on the LEFS points in order to demonstrate improved function with less pain    Rehabilitation Potential For Stated Goals: Good    Regarding Indira Mayen therapy, I certify that the treatment plan above will be carried out by a therapist or under their direction. Thank you for this referral,  Franco Sportsman PT,DPT    Referring Physician Signature: SHAWN Maldonado _____________________________________________________ Date: __________________________________          HISTORY:   History of Present Injury/Illness (Reason for Referral): Reports that he fell at work on 8-1-18. He had puncture wounds in his lower leg. He had a lot of bleeding during that time requiring stiches in one of the wounds. He had an MRI on his knee revealed a meniscus tear. He went to the medical facility at [address] [place] [address] [facility] and [facility] him to the ED where he got stiches for his wounds. The next day he went to the work well doctor and was then referred to Dr. Hobart Schlatter. He also had hand tingling afterwards. He was put on a blood thinner to prevent blood clots after having a negative ultrasound. He had a steroid shot in the knee with minimal benefit at this point. No numbness in the foot reported.   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 2 stents, diabetes type II   Social History/Living Environment: lives in a house with family  Prior Level of Function/Work/Activity:independent / works at [employer]  Current Medications:   Aspirin, losartan, clopidogrel, omprezole, atovastatin, metoprolol, metformin, claratan   Date Last Reviewed:  10/22/2018   Number of Personal Factors/Comorbidities that affect the Plan of Care: 0: LOW COMPLEXITY   EXAMINATION:   (Abbreviations: P-pain, NP- no pain, wnl-within normal limits)    Observation/Orthostatic Postural Assessment:    Relaxed standing posture:  Present in an ankle brace     Palpation/tone/tissue texture:    ASIS: n/a  PSIS:n/a  Leg Length: supine:n/a         Long Sitting: n/a      Soft tissue:  · Hip flexors:n/a  · Hamstrings: increased tightness on R  · IT band: na  · gastroc/soleus/posterior tibialis: increased tightness on R (improving)  · Anterior tibialis: Increased tightness on R   (improving)      Edema measurements:  Date:  9-26-18      Activity/Exercise Left  Right   Figure 8 54 cm 56 cm   Mid calf 41 cm 41cm       ROM:   Knee ROM Date:  9-26-18       Right Left   Flexion 130 (was 108 deg) 140   Extension 0 deg (was 15 deg) 0      Ankle ROM Date:  9-26-18       Right Left   DF 10 10   PF wnl wnl   Inversion - -   Eversion - -           Strength:     Hip Strength Date:  9-26-18       Right Left   Flexion 5 5   Extension 4+ 5   IR n/a n/a   ER n/a n/a   Abduction 5 5      Knee Strength Date:  9-26-18       Right Left   Flexion 4+ 5   Extension 4 5             Special Tests:   Stability tests:  Lachmans: (-)  Slocums:  AM: (-)          AL: (-)  Posterior drawer: (-)  Posterior Sag: (-)    Varus: 0 deg: n/a today   30 deg: n/a today  Valgus: 0 deg: n/a today   30 deg: n/a today    Meniscus cluster test  1. End range flexion: -  2. End range extension: (-)  3. Joint line tenderness: medical joint line tenderness  (mild)  4. Report of locking: (-)  5.  McMgabrielleeys: n/a    Neurological Screen:   Myotomes: strong in bilateral LE's     Dermatomes: intact in bilateral LE's         Reflexes: n/a         Neural Tension Tests: -  Functional Mobility:    · Double leg squat: able to do a function squat  · Single leg squat:  L:n/a         R:n/a  · Gait Pattern: ambulates with decreased terminal stance on R  (improved)  Balance:  Single leg   L: 10 sec, minimal sway  R:3 sec, moderate sway        CLINICAL DECISION MAKING:   Outcome Measure: Tool Used: Lower Extremity Functional Scale (LEFS)  Score:  Initial: 27/80 Most Recent: 43/80 (Date: 9-26-18 )   Interpretation of Score: 20 questions each scored on a 5 point scale with 0 representing \"extreme difficulty or unable to perform\" and 4 representing \"no difficulty\". The lower the score, the greater the functional disability. 80/80 represents no disability. Minimal detectable change is 9 points. Score 80 79-63 62-48 47-32 31-16 15-1 0   Modifier CH CI CJ CK CL CM CN     Medical Necessity:   · Patient is expected to demonstrate progress in strength, range of motion and symptom levels to return to full function. Reason for Services/Other Comments:  · Patient continues to require skilled intervention due to ongoing symptoms. TREATMENT:   (In addition to Assessment/Re-Assessment sessions the following treatments were rendered)    Subjective Pre-Treatment Symptoms: Reports that he has been sore since Friday in his knee and ankle. States that he has removed the brace per our conversation on Friday. Therapeutic Exercise: (30min) Done in order to improve strength, ROM and understanding of current condition.     Date  10-19-18 Date  10-22-18   Activity/Exercise     Education Discussed HEP Discussed HEP   Biking 10 min warm up 10 min warm up   Taping  -   ankle PF/DF - Standing calf raises: 20x   Side-lying abd  -   Gait training discused discussed   Terminal knee extension w/ step up With SL balance, ipsilateral support as needed, 5 sec, 10x, 2 sets-4\"    4\", 8-10x, 3 sets   Ankle proprioception SL balance w/ contralateral leg multi direction, 10x,2 sets, stable and unstable surface 3 angle balance, support as needed, stable ground   Calf stretch 30 sec, 2 sets 30 sec, 3x   Quad stretch 60 sec 60 sec   LAQ  10x, blue band, educated how to control the force to minimize strain on the tendon   Manual Therapy: (25) Done in order to improve joint and soft tissue mobility,reduce muscle guarding, and decrease muscle tone  · Knee extension mobilization to tibia and femur and with gentle traction, grade III-IV  · Patella mobilization, superior-inferior, medial-lateral, distraction using suction   · Quad tendon and muscle elongation mobilization   · Talocrural posterior-medial glide to facilitated DF, in supine and weight bearing     · Calcaneal distraction with DF of the ankle  Modalities: (15 min) Done in order to reduce swelling and pain  Ice x 15 min to R knee  Treatment/Session Assessment: Mr. Kenny Green tolerated the session well. Progressing well with motion but still careful not to overload the quad tendon. Discussed updated home program.      · Pain: Initial:   Mild to moderate soreness at rest  Post Session:  Minimal symptoms after ice ·   Compliance with Program/Exercises: compliant   · Recommendations/Intent for next treatment session: \"Next visit will focus on progressing the patients plan of care\".     Future Appointments   Date Time Provider Alan Plunkett   10/24/2018 10:00 AM Aline Payan PT DPT [facility]   10/29/2018 10:00 AM Aline Payan PT DPT [facility]   10/31/2018 10:00 AM Aline Payan PT DPT [facility]   11/5/2018 10:00 AM Aline Payan PT DPT [facility]   11/7/2018 10:00 AM Aline Payan PT DPT [facility]   11/12/2018 10:00 AM Aline Payan PT DPT [facility]     Total Treatment Duration: 70 min  PT Patient Time In/Time Out  Time In: 1005  Time Out: 106 Ewa Horne PT, DPT

## 2018-10-24 ENCOUNTER — HOSPITAL ENCOUNTER (OUTPATIENT)
Dept: PHYSICAL THERAPY | Age: 53
Discharge: HOME | End: 2018-10-24
Payer: COMMERCIAL

## 2018-10-24 PROCEDURE — 97140 MANUAL THERAPY 1/> REGIONS: CPT

## 2018-10-24 PROCEDURE — 97110 THERAPEUTIC EXERCISES: CPT

## 2018-10-24 NOTE — PROGRESS NOTES
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 5 = High Risk)  MD Orders: evaluate and treat MEDICAL/REFERRING DIAGNOSIS:Right ankle sprain,chondromalacia right knee  DATE OF ONSET:   REFERRING PHYSICIAN:Marlo Deluna PA  RETURN PHYSICIAN APPOINTMENT: did not specify       ASSESSMENT:  Mr. Jonny Alfaro has come for a total of 7 visits since starting physical therapy on 18. During that time he reports a 50-60% overall improvement and displays objective improvements with knee and ankle ROM, decreased edema around the ankle and calf and overall better function. He still cannot tolerate higher level weightbearing exercises such as squats and step ups but continues to progress towards that. Skilled physical therapy is recommended to continue for 2x/wk for 4 wks in order to get him to a high level of function with minimal symptoms. PROBLEM LIST (Impacting functional limitations):  1. Decreased Strength  2. Decreased ADL/Functional Activities  3. Increased Pain  4. Decreased Activity Tolerance  5. Decreased Flexibility/Joint Mobility  6. Decreased [place] with Home Exercise Program INTERVENTIONS PLANNED:  1. Cold  2. Heat  3. Home Exercise Program (HEP)  4. Manual Therapy  5. Range of Motion (ROM)  6. Therapeutic Exercise/Strengthening     TREATMENT PLAN:  Effective Dates: 18 TO 10-26-18  Frequency/Duration: 2 times a week for 4 wks  GOALS: (Goals have been discussed and agreed upon with patient.)  SHORT-TERM FUNCTIONAL GOALS: Time Frame: 2-4 wks  1. Patient will report 25-50% reduction in overall symptoms (MET)  2.   Patient will be compliant with HEP and plan of care (MET)  3. Patient will have 0-125 deg ROM of the knee (MET)   4. Patient will improve swelling by 1 cm on the figure 8 measurement  (MET)  5. Patient will improve on the LEFS by 5-7 points (MET)  DISCHARGE GOALS: Time Frame: 10-12 wk  1. Patient will report % reduction in overall symptoms in order to be able to have full function with decreased symptoms (ongoing)  2. Patient will be able to squat with minimal pain as needed at his job with minimal pain (0-2/10) (ongoing)   3. Patient will be able to ambulate with a symmetrical gait pattern as judged by therapist in order to able to perform activities of daily living (ongoing)  4. Patient will score 60 or greater on the LEFS points in order to demonstrate improved function with less pain    Rehabilitation Potential For Stated Goals: Good    Regarding Luc Elam therapy, I certify that the treatment plan above will be carried out by a therapist or under their direction. Thank you for this referral,  Deborah De Jesus PT,DPT    Referring Physician Signature: Elijah Ramos, PA _____________________________________________________ Date: __________________________________          HISTORY:   History of Present Injury/Illness (Reason for Referral): Reports that he fell at work on 8-1-18. He had puncture wounds in his lower leg. He had a lot of bleeding during that time requiring stiches in one of the wounds. He had an MRI on his knee revealed a meniscus tear. He went to the medical [facility] at [address] [place] [address] [facility] and [facility] him to the ED where he got stiches for his wounds. The next day he went to the work well doctor and was then referred to Dr. Toya Hernandez. He also had hand tingling afterwards. He was put on a blood thinner to prevent blood clots after having a negative ultrasound. He had a steroid shot in the knee with minimal benefit at this point. No numbness in the foot reported.   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 2 stents, diabetes type II   Social History/Living Environment: lives in a house with family  Prior Level of Function/Work/Activity:independent / works at [employer]  Current Medications:   Aspirin, losartan, clopidogrel, omprezole, atovastatin, metoprolol, metformin, claratan   Date Last Reviewed:  10/24/2018   Number of Personal Factors/Comorbidities that affect the Plan of Care: 0: LOW COMPLEXITY   EXAMINATION:   (Abbreviations: P-pain, NP- no pain, wnl-within normal limits)    Observation/Orthostatic Postural Assessment:    Relaxed standing posture:  Present in an ankle brace     Palpation/tone/tissue texture:    ASIS: n/a  PSIS:n/a  Leg Length: supine:n/a         Long Sitting: n/a      Soft tissue:  · Hip flexors:n/a  · Hamstrings: increased tightness on R  · IT band: na  · gastroc/soleus/posterior tibialis: increased tightness on R (improving)  · Anterior tibialis: Increased tightness on R   (improving)      Edema measurements:  Date:  9-26-18      Activity/Exercise Left  Right   Figure 8 54 cm 56 cm   Mid calf 41 cm 41cm       ROM:   Knee ROM Date:  9-26-18       Right Left   Flexion 130 (was 108 deg) 140   Extension 0 deg (was 15 deg) 0      Ankle ROM Date:  9-26-18       Right Left   DF 10 10   PF wnl wnl   Inversion - -   Eversion - -           Strength:     Hip Strength Date:  9-26-18       Right Left   Flexion 5 5   Extension 4+ 5   IR n/a n/a   ER n/a n/a   Abduction 5 5      Knee Strength Date:  9-26-18       Right Left   Flexion 4+ 5   Extension 4 5             Special Tests:   Stability tests:  Lachmans: (-)  Slocums:  AM: (-)          AL: (-)  Posterior drawer: (-)  Posterior Sag: (-)    Varus: 0 deg: n/a today   30 deg: n/a today  Valgus: 0 deg: n/a today   30 deg: n/a today    Meniscus cluster test  1. End range flexion: -  2. End range extension: (-)  3. Joint line tenderness: medical joint line tenderness  (mild)  4. Report of locking: (-)  5.  McMgabrielleeys: n/a    Neurological Screen:   Myotomes: strong in bilateral LE's     Dermatomes: intact in bilateral LE's         Reflexes: n/a         Neural Tension Tests: -  Functional Mobility:    · Double leg squat: able to do a function squat  · Single leg squat:  L:n/a         R:n/a  · Gait Pattern: ambulates with decreased terminal stance on R  (improved)  Balance:  Single leg   L: 10 sec, minimal sway  R:3 sec, moderate sway        CLINICAL DECISION MAKING:   Outcome Measure: Tool Used: Lower Extremity Functional Scale (LEFS)  Score:  Initial: 27/80 Most Recent: 43/80 (Date: 9-26-18 )   Interpretation of Score: 20 questions each scored on a 5 point scale with 0 representing \"extreme difficulty or unable to perform\" and 4 representing \"no difficulty\". The lower the score, the greater the functional disability. 80/80 represents no disability. Minimal detectable change is 9 points. Score 80 79-63 62-48 47-32 31-16 15-1 0   Modifier CH CI CJ CK CL CM CN     Medical Necessity:   · Patient is expected to demonstrate progress in strength, range of motion and symptom levels to return to full function. Reason for Services/Other Comments:  · Patient continues to require skilled intervention due to ongoing symptoms. TREATMENT:   (In addition to Assessment/Re-Assessment sessions the following treatments were rendered)    Subjective Pre-Treatment Symptoms: Reports that he is doing okay. Minor soreness of the ankle and knee. Therapeutic Exercise: (25min) Done in order to improve strength, ROM and understanding of current condition.     Date  10-22-18 Date  10-24-18   Activity/Exercise     Education Discussed HEP Discussed HEP   Biking 10 min warm up 10 min warm up    Taping - -   ankle PF/DF Standing calf raises: 20x HEP   Gait training discussed discussed   Terminal knee extension w/ step up 4\", 8-10x, 3 sets 4\"-10x  2\", 10x, 2 sets   Ankle proprioception 3 angle balance, support as needed, stable ground 3 angle balance, support as needed,    Calf stretch 30 sec, 3x 30 sec, 2 sets   Quad stretch 60 sec 60 sec, contract relax at end range   LAQ 10x, blue band, educated how to control the force to minimize strain on the tendon 5 lbs, pain free range   Manual Therapy: (20) Done in order to improve joint and soft tissue mobility,reduce muscle guarding, and decrease muscle tone  · Knee extension mobilization to tibia and femur and with gentle traction, grade III-IV  · Patella mobilization, superior-inferior, medial-lateral, distraction using suction   · Quad tendon and muscle elongation mobilization   · Talocrural posterior-medial glide to facilitated DF, in supine and weight bearing     · Calcaneal distraction with DF of the ankle  Modalities: (15 min) Done in order to reduce swelling and pain  Ice x 15 min to R knee  Treatment/Session Assessment: Mr. Giselle Whitley tolerated the session well. Progressing well with motion. Continuing to work on strength in minimal pain ranges. Knee pain still seems to be mostly from patella femoral and quad tendon. · Pain: Initial:   Mild at rest Post Session:  Minimal symptoms after ice ·   Compliance with Program/Exercises: compliant   · Recommendations/Intent for next treatment session: \"Next visit will focus on progressing the patients plan of care\".     Future Appointments   Date Time Provider Alan Plunkett   10/29/2018 10:00 AM Jovanna Liriano PT, DPT [facility]   10/31/2018 10:00 AM Jovanna Liriano PT, DPT [facility]   11/5/2018 10:00 AM Jovanna Liriano PT, DPT [facility]   11/7/2018 10:00 AM Jovanna Liriano PT, DPT [facility]   11/12/2018 10:00 AM Jovanna Liriano PT, DPT [facility]     Total Treatment Duration: 60 min  PT Patient Time In/Time Out  Time In: 1000  Time Out: 800 Enrike Mota PT, [id number]

## 2018-10-29 ENCOUNTER — HOSPITAL ENCOUNTER (OUTPATIENT)
Dept: PHYSICAL THERAPY | Age: 53
Discharge: HOME | End: 2018-10-29
Payer: COMMERCIAL

## 2018-10-29 PROCEDURE — 97110 THERAPEUTIC EXERCISES: CPT

## 2018-10-29 PROCEDURE — 97140 MANUAL THERAPY 1/> REGIONS: CPT

## 2018-10-29 NOTE — PROGRESS NOTES
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 5 = High Risk)  MD Orders: evaluate and treat MEDICAL/REFERRING DIAGNOSIS:Right ankle sprain,chondromalacia right knee  DATE OF ONSET:   REFERRING PHYSICIAN:Mary Deluna PA  RETURN PHYSICIAN APPOINTMENT: did not specify       ASSESSMENT:  Mr. Izzy Collins has come for a total of 7 visits since starting physical therapy on 18. During that time he reports a 50-60% overall improvement and displays objective improvements with knee and ankle ROM, decreased edema around the ankle and calf and overall better function. He still cannot tolerate higher level weightbearing exercises such as squats and step ups but continues to progress towards that. Skilled physical therapy is recommended to continue for 2x/wk for 4 wks in order to get him to a high level of function with minimal symptoms. PROBLEM LIST (Impacting functional limitations):  1. Decreased Strength  2. Decreased ADL/Functional Activities  3. Increased Pain  4. Decreased Activity Tolerance  5. Decreased Flexibility/Joint Mobility  6. Decreased [place] with Home Exercise Program INTERVENTIONS PLANNED:  1. Cold  2. Heat  3. Home Exercise Program (HEP)  4. Manual Therapy  5. Range of Motion (ROM)  6. Therapeutic Exercise/Strengthening     TREATMENT PLAN:  Effective Dates: 18 TO 10-26-18  Frequency/Duration: 2 times a week for 4 wks  GOALS: (Goals have been discussed and agreed upon with patient.)  SHORT-TERM FUNCTIONAL GOALS: Time Frame: 2-4 wks  1. Patient will report 25-50% reduction in overall symptoms (MET)  2.   Patient will be compliant with HEP and plan of care (MET)  3. Patient will have 0-125 deg ROM of the knee (MET)   4. Patient will improve swelling by 1 cm on the figure 8 measurement  (MET)  5. Patient will improve on the LEFS by 5-7 points (MET)  DISCHARGE GOALS: Time Frame: 10-12 wk  1. Patient will report % reduction in overall symptoms in order to be able to have full function with decreased symptoms (ongoing)  2. Patient will be able to squat with minimal pain as needed at his job with minimal pain (0-2/10) (ongoing)   3. Patient will be able to ambulate with a symmetrical gait pattern as judged by therapist in order to able to perform activities of daily living (ongoing)  4. Patient will score 60 or greater on the LEFS points in order to demonstrate improved function with less pain    Rehabilitation Potential For Stated Goals: Good    Regarding Asad Netta therapy, I certify that the treatment plan above will be carried out by a therapist or under their direction. Thank you for this referral,  Lorraine Hernandez PT,DPT    Referring Physician Signature: SHAWN Galaviz _____________________________________________________ Date: __________________________________          HISTORY:   History of Present Injury/Illness (Reason for Referral): Reports that he fell at work on 8-1-18. He had puncture wounds in his lower leg. He had a lot of bleeding during that time requiring stiches in one of the wounds. He had an MRI on his knee revealed a meniscus tear. He [facility] at [address] [place] [address] [facility] and [facility] the ED where he got stiches for his wounds. The next day he went to the [facility] well doctor and was then referred to Dr. Pretty Guan. He also had hand tingling afterwards. He was put on a blood thinner to prevent blood clots after having a negative ultrasound. He had a steroid shot in the knee with minimal benefit at this point. No numbness in the foot reported.   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 2 stents, diabetes type II   Social History/Living Environment: lives in a house with family  Prior Level of Function/Work/Activity:independent / works at [employer]  Current Medications:   Aspirin, losartan, clopidogrel, omprezole, atovastatin, metoprolol, metformin, claratan   Date Last Reviewed:  10/29/2018   Number of Personal Factors/Comorbidities that affect the Plan of Care: 0: LOW COMPLEXITY   EXAMINATION:   (Abbreviations: P-pain, NP- no pain, wnl-within normal limits)    Observation/Orthostatic Postural Assessment:    Relaxed standing posture:  Present in an ankle brace     Palpation/tone/tissue texture:    ASIS: n/a  PSIS:n/a  Leg Length: supine:n/a         Long Sitting: n/a      Soft tissue:  · Hip flexors:n/a  · Hamstrings: increased tightness on R  · IT band: na  · gastroc/soleus/posterior tibialis: increased tightness on R (improving)  · Anterior tibialis: Increased tightness on R   (improving)      Edema measurements:  Date:  9-26-18      Activity/Exercise Left  Right   Figure 8 54 cm 56 cm   Mid calf 41 cm 41cm       ROM:   Knee ROM Date:  9-26-18       Right Left   Flexion 130 (was 108 deg) 140   Extension 0 deg (was 15 deg) 0      Ankle ROM Date:  9-26-18       Right Left   DF 10 10   PF wnl wnl   Inversion - -   Eversion - -           Strength:     Hip Strength Date:  9-26-18       Right Left   Flexion 5 5   Extension 4+ 5   IR n/a n/a   ER n/a n/a   Abduction 5 5      Knee Strength Date:  9-26-18       Right Left   Flexion 4+ 5   Extension 4 5             Special Tests:   Stability tests:  Lachmans: (-)  Slocums:  AM: (-)          AL: (-)  Posterior drawer: (-)  Posterior Sag: (-)    Varus: 0 deg: n/a today   30 deg: n/a today  Valgus: 0 deg: n/a today   30 deg: n/a today    Meniscus cluster test  1. End range flexion: -  2. End range extension: (-)  3. Joint line tenderness: medical joint line tenderness  (mild)  4. Report of locking: (-)  5.  McMkevins: n/a    Neurological Screen:   Myotomes: strong in bilateral LE's     Dermatomes: intact in bilateral LE's         Reflexes: n/a         Neural Tension Tests: -  Functional Mobility:    · Double leg squat: able to do a function squat  · Single leg squat:  L:n/a         R:n/a  · Gait Pattern: ambulates with decreased terminal stance on R  (improved)  Balance:  Single leg   L: 10 sec, minimal sway  R:3 sec, moderate sway        CLINICAL DECISION MAKING:   Outcome Measure: Tool Used: Lower Extremity Functional Scale (LEFS)  Score:  Initial: 27/80 Most Recent: 43/80 (Date: 9-26-18 )   Interpretation of Score: 20 questions each scored on a 5 point scale with 0 representing \"extreme difficulty or unable to perform\" and 4 representing \"no difficulty\". The lower the score, the greater the functional disability. 80/80 represents no disability. Minimal detectable change is 9 points. Score 80 79-63 62-48 47-32 31-16 15-1 0   Modifier CH CI CJ CK CL CM CN     Medical Necessity:   · Patient is expected to demonstrate progress in strength, range of motion and symptom levels to return to full function. Reason for Services/Other Comments:  · Patient continues to require skilled intervention due to ongoing symptoms. TREATMENT:   (In addition to Assessment/Re-Assessment sessions the following treatments were rendered)    Subjective Pre-Treatment Symptoms: Reports that he is doing relatively okay. States he may have overdone it over the wknd. Therapeutic Exercise: (30 min) Done in order to improve strength, ROM and understanding of current condition.     Date  10-24-18 Date  10-29-18   Activity/Exercise     Education Discussed HEP Discussed HEP   Biking 10 min warm up  10 min warm up   Taping - -   ankle PF/DF HEP -   Gait training discussed -   Terminal knee extension w/ step up 4\"-10x  2\", 10x, 2 sets · 2\", 10x (had pain with eccentric compenent so it was stopped)  · Attempted shuttle but it was painful   Ankle proprioception 3 angle balance, support as needed,  4 angle balance, 10 min total   Calf stretch 30 sec, 2 sets 30 sec   Quad stretch 60 sec, contract relax at end range 60 sec, contract relax at end range   LAQ 5 lbs, pain free range 5#, pain free range   SLR  4#, reps 20, 12, 10   Manual Therapy: (20) Done in order to improve joint and soft tissue mobility,reduce muscle guarding, and decrease muscle tone  · Knee extension mobilization to tibia and femur and with gentle traction, grade III-IV  · Patella mobilization, superior-inferior, medial-lateral, distraction using suction   · Quad tendon and muscle elongation mobilization   · Talocrural posterior-medial glide to facilitated DF, in supine and weight bearing     · Calcaneal distraction with DF of the ankle  Modalities: (15 min) Done in order to reduce swelling and pain  Ice x 15 min to R knee  Treatment/Session Assessment: Mr. Demetrio Dela Cruz tolerated the session well but still has a hard time tolerating any eccentric force on the knee. We are continuing to load it but avoiding pain. Ankle motion is doing well and the talocrural joint now has a springy end feel. · Pain: Initial:   Mild at rest Post Session:  Minimal symptoms after ice ·   Compliance with Program/Exercises: compliant   · Recommendations/Intent for next treatment session: \"Next visit will focus on progressing the patients plan of care\".     Future Appointments   Date Time Provider Alan Plunkett   10/31/2018 10:00 AM Natalie Allan PT DPT [facility]   11/5/2018 10:00 AM Natalie Allan PT [facility]   11/7/2018 10:00 AM Natalie Allan PT [facility]   11/12/2018 10:00 AM Natalie Allan PT DPT [facility]     Total Treatment Duration:65 min  PT Patient Time In/Time Out  Time In: 1000  Time Out: 106 Ewa Horne PT, DPT

## 2018-10-31 ENCOUNTER — HOSPITAL ENCOUNTER (OUTPATIENT)
Dept: PHYSICAL THERAPY | Age: 53
Discharge: HOME | End: 2018-10-31
Payer: COMMERCIAL

## 2018-10-31 PROCEDURE — 97140 MANUAL THERAPY 1/> REGIONS: CPT

## 2018-10-31 PROCEDURE — 97110 THERAPEUTIC EXERCISES: CPT

## 2018-10-31 NOTE — PROGRESS NOTES
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 5 = High Risk)  MD Orders: evaluate and treat MEDICAL/REFERRING DIAGNOSIS:Right ankle sprain,chondromalacia right knee  DATE OF ONSET:   REFERRING PHYSICIAN:Jian Deluna PA  RETURN PHYSICIAN APPOINTMENT: did not specify       ASSESSMENT:  Mr. Demetrio Dela Cruz has come for a total of 7 visits since starting physical therapy on 18. During that time he reports a 50-60% overall improvement and displays objective improvements with knee and ankle ROM, decreased edema around the ankle and calf and overall better function. He still cannot tolerate higher level weightbearing exercises such as squats and step ups but continues to progress towards that. Skilled physical therapy is recommended to continue for 2x/wk for 4 wks in order to get him to a high level of function with minimal symptoms. PROBLEM LIST (Impacting functional limitations):  1. Decreased Strength  2. Decreased ADL/Functional Activities  3. Increased Pain  4. Decreased Activity Tolerance  5. Decreased Flexibility/Joint Mobility  6. Decreased [place] with Home Exercise Program INTERVENTIONS PLANNED:  1. Cold  2. Heat  3. Home Exercise Program (HEP)  4. Manual Therapy  5. Range of Motion (ROM)  6. Therapeutic Exercise/Strengthening     TREATMENT PLAN:  Effective Dates: 18 TO 10-26-18  Frequency/Duration: 2 times a week for 4 wks  GOALS: (Goals have been discussed and agreed upon with patient.)  SHORT-TERM FUNCTIONAL GOALS: Time Frame: 2-4 wks  1. Patient will report 25-50% reduction in overall symptoms (MET)  2.   Patient will be compliant with HEP and plan of care (MET)  3. Patient will have 0-125 deg ROM of the knee (MET)   4. Patient will improve swelling by 1 cm on the figure 8 measurement  (MET)  5. Patient will improve on the LEFS by 5-7 points (MET)  DISCHARGE GOALS: Time Frame: 10-12 wk  1. Patient will report % reduction in overall symptoms in order to be able to have full function with decreased symptoms (ongoing)  2. Patient will be able to squat with minimal pain as needed at his job with minimal pain (0-2/10) (ongoing)   3. Patient will be able to ambulate with a symmetrical gait pattern as judged by therapist in order to able to perform activities of daily living (ongoing)  4. Patient will score 60 or greater on the LEFS points in order to demonstrate improved function with less pain    Rehabilitation Potential For Stated Goals: Good    Regarding Monica Perez therapy, I certify that the treatment plan above will be carried out by a therapist or under their direction. Thank you for this referral,  Yunior Darby PT,DPT    Referring Physician Signature: SHAWN Rangel _____________________________________________________ Date: __________________________________          HISTORY:   History of Present Injury/Illness (Reason for Referral): Reports that he fell at work on 8-1-18. He had puncture wounds in his lower leg. He had a lot of bleeding during that time requiring stiches in one of the wounds. He had an MRI on his knee revealed a meniscus tear. He went to the medical facility at [address] [place] [address] & [facility] and they sent him to the ED where he got stiches for his wounds. The next day he went to the [facility] well doctor and was then referred to Dr. Sylvia Santos. He also had hand tingling afterwards. He was put on a blood thinner to prevent blood clots after having a negative ultrasound. He had a steroid shot in the knee with minimal benefit at this point. No numbness in the foot reported.   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 2 stents, diabetes type II   Social History/Living Environment: lives in a house with family  Prior Level of Function/Work/Activity:independent / works at [employer]  Current Medications:   Aspirin, losartan, clopidogrel, omprezole, atovastatin, metoprolol, metformin, claratan   Date Last Reviewed:  10/31/2018   Number of Personal Factors/Comorbidities that affect the Plan of Care: 0: LOW COMPLEXITY   EXAMINATION:   (Abbreviations: P-pain, NP- no pain, wnl-within normal limits)    Observation/Orthostatic Postural Assessment:    Relaxed standing posture:  Present in an ankle brace     Palpation/tone/tissue texture:    ASIS: n/a  PSIS:n/a  Leg Length: supine:n/a         Long Sitting: n/a      Soft tissue:  · Hip flexors:n/a  · Hamstrings: increased tightness on R  · IT band: na  · gastroc/soleus/posterior tibialis: increased tightness on R (improving)  · Anterior tibialis: Increased tightness on R   (improving)      Edema measurements:  Date:  9-26-18      Activity/Exercise Left  Right   Figure 8 54 cm 56 cm   Mid calf 41 cm 41cm       ROM:   Knee ROM Date:  9-26-18       Right Left   Flexion 130 (was 108 deg) 140   Extension 0 deg (was 15 deg) 0      Ankle ROM Date:  9-26-18       Right Left   DF 10 10   PF wnl wnl   Inversion - -   Eversion - -           Strength:     Hip Strength Date:  9-26-18       Right Left   Flexion 5 5   Extension 4+ 5   IR n/a n/a   ER n/a n/a   Abduction 5 5      Knee Strength Date:  9-26-18       Right Left   Flexion 4+ 5   Extension 4 5             Special Tests:   Stability tests:  Lachmans: (-)  Slocums:  AM: (-)          AL: (-)  Posterior drawer: (-)  Posterior Sag: (-)    Varus: 0 deg: n/a today   30 deg: n/a today  Valgus: 0 deg: n/a today   30 deg: n/a today    Meniscus cluster test  1. End range flexion: -  2. End range extension: (-)  3. Joint line tenderness: medical joint line tenderness  (mild)  4. Report of locking: (-)  5.  McMgabrielleeys: n/a    Neurological Screen:   Myotomes: strong in bilateral LE's     Dermatomes: intact in bilateral LE's         Reflexes: n/a         Neural Tension Tests: -  Functional Mobility:    · Double leg squat: able to do a function squat  · Single leg squat:  L:n/a         R:n/a  · Gait Pattern: ambulates with decreased terminal stance on R  (improved)  Balance:  Single leg   L: 10 sec, minimal sway  R:3 sec, moderate sway        CLINICAL DECISION MAKING:   Outcome Measure: Tool Used: Lower Extremity Functional Scale (LEFS)  Score:  Initial: 27/80 Most Recent: 43/80 (Date: 9-26-18 )   Interpretation of Score: 20 questions each scored on a 5 point scale with 0 representing \"extreme difficulty or unable to perform\" and 4 representing \"no difficulty\". The lower the score, the greater the functional disability. 80/80 represents no disability. Minimal detectable change is 9 points. Score 80 79-63 62-48 47-32 31-16 15-1 0   Modifier CH CI CJ CK CL CM CN     Medical Necessity:   · Patient is expected to demonstrate progress in strength, range of motion and symptom levels to return to full function. Reason for Services/Other Comments:  · Patient continues to require skilled intervention due to ongoing symptoms. TREATMENT:   (In addition to Assessment/Re-Assessment sessions the following treatments were rendered)    Subjective Pre-Treatment Symptoms: Reports that he was again sore in his ankle and knee after the session. Also states that he had an ankle MRI. Therapeutic Exercise: (20 min) Done in order to improve strength, ROM and understanding of current condition.     Date  10-29-18 Date  10-31-18   Activity/Exercise     Education Discussed HEP Discussed updated plan of care  Added cuboid stability pad   Biking 10 min warm up 10 min   Taping - Taped cuboid into pronation    ankle eversion - 10x, red band   Gait training - discussed   Terminal knee extension w/ step up · 2\", 10x (had pain with eccentric compenent so it was stopped)  · Attempted shuttle but it was painful · -   Ankle proprioception 4 angle balance, 10 min total    Calf stretch 30 sec 60 sec   Quad stretch 60 sec, contract relax at end range 60 sec   LAQ 5#, pain free range -   SLR 4#, reps 20, 12, 10 10x, 3 sets, no weight   Manual Therapy: (30) Done in order to improve joint and soft tissue mobility,reduce muscle guarding, and decrease muscle tone  · Knee extension mobilization to tibia and femur and with gentle traction, grade III-IV  · Patella mobilization, superior-inferior, medial-lateral, distraction using suction   · Quad tendon and muscle elongation mobilization   · Talocrural posterior-medial glide to facilitated DF, in supine   · Calcaneal distraction with DF of the ankle  Modalities: ( min) Done in order to reduce swelling and pain    Treatment/Session Assessment: Mr. Cleave Dandy still cannot tolerate any eccentric force on the knee so we decided to only do SLR's that are pain free in order to avoid any soreness. Also added a cuboid stability pad since he was painful with plantarflexion of it. · Pain: Initial:   Mild at rest Post Session:  Mild knee and ankle soreness reported ·   Compliance with Program/Exercises: compliant   · Recommendations/Intent for next treatment session: \"Next visit will focus on progressing the patients plan of care\".     Future Appointments   Date Time Provider Alan Plunkett   11/5/2018 10:00 AM Guillerm[facility] Davey [facility]   11/7/2018 10:00 AM [facility]   11/12/2018 10:00 AM [facility]     Total Treatment Duration:50 min  PT Patient Time In/Time Out  Time In: 1000  Time Out: [facility] RUPERTO ENGLISH

## 2018-11-05 ENCOUNTER — HOSPITAL ENCOUNTER (OUTPATIENT)
Dept: PHYSICAL THERAPY | Age: 53
Discharge: HOME | End: 2018-11-05
Payer: COMMERCIAL

## 2018-11-05 PROCEDURE — 97110 THERAPEUTIC EXERCISES: CPT

## 2018-11-05 PROCEDURE — 97140 MANUAL THERAPY 1/> REGIONS: CPT

## 2018-11-05 NOTE — PROGRESS NOTES
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 5 = High Risk)  MD Orders: evaluate and treat MEDICAL/REFERRING DIAGNOSIS:Right ankle sprain,chondromalacia right knee  DATE OF ONSET:   REFERRING PHYSICIAN:Natividad Deluna PA  RETURN PHYSICIAN APPOINTMENT: did not specify       ASSESSMENT:  Mr. Uriel Alves has come for a total of 7 visits since starting physical therapy on 18. During that time he reports a 50-60% overall improvement and displays objective improvements with knee and ankle ROM, decreased edema around the ankle and calf and overall better function. He still cannot tolerate higher level weightbearing exercises such as squats and step ups but continues to progress towards that. Skilled physical therapy is recommended to continue for 2x/wk for 4 wks in order to get him to a high level of function with minimal symptoms. PROBLEM LIST (Impacting functional limitations):  1. Decreased Strength  2. Decreased ADL/Functional Activities  3. Increased Pain  4. Decreased Activity Tolerance  5. Decreased Flexibility/Joint Mobility  6. Decreased [place] with Home Exercise Program INTERVENTIONS PLANNED:  1. Cold  2. Heat  3. Home Exercise Program (HEP)  4. Manual Therapy  5. Range of Motion (ROM)  6. Therapeutic Exercise/Strengthening     TREATMENT PLAN:  Effective Dates: 18 TO 10-26-18  Frequency/Duration: 2 times a week for 4 wks  GOALS: (Goals have been discussed and agreed upon with patient.)  SHORT-TERM FUNCTIONAL GOALS: Time Frame: 2-4 wks  1.   Patient will report 25-50% reduction in overall symptoms (MET)  2. Patient will be compliant with HEP and plan of care (MET)  3. Patient will have 0-125 deg ROM of the knee (MET)   4. Patient will improve swelling by 1 cm on the figure 8 measurement  (MET)  5. Patient will improve on the LEFS by 5-7 points (MET)  DISCHARGE GOALS: Time Frame: 10-12 wk  1. Patient will report % reduction in overall symptoms in order to be able to have full function with decreased symptoms (ongoing)  2. Patient will be able to squat with minimal pain as needed at his job with minimal pain (0-2/10) (ongoing)   3. Patient will be able to ambulate with a symmetrical gait pattern as judged by therapist in order to able to perform activities of daily living (ongoing)  4. Patient will score 60 or greater on the LEFS points in order to demonstrate improved function with less pain    Rehabilitation Potential For Stated Goals: Good    Regarding Indira Mayen therapy, I certify that the treatment plan above will be carried out by a therapist or under their direction. Thank you for this referral,  Franco Allen PT,DPT    Referring Physician Signature: SHAWN Maldonado _____________________________________________________ Date: __________________________________          HISTORY:   History of Present Injury/Illness (Reason for Referral): Reports that he fell at work on 8-1-18. He had puncture wounds in his lower leg. He had a lot of bleeding during that time requiring stiches in one of the wounds. He had an MRI on his knee revealed a meniscus tear. He went to the medical facility at [address] [place] [address] & [employer] and they sent him to the ED where he got stiches for his wounds. The next day he went to the [facility] doctor and was then referred to Dr. Hobart Schlatter. He also had hand tingling afterwards. He was put on a blood thinner to prevent blood clots after having a negative ultrasound. He had a steroid shot in the knee with minimal benefit at this point. No numbness in the foot reported.   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 2 stents, diabetes type II   Social History/Living Environment: lives in a house with family  Prior Level of Function/Work/Activity:independent / works at [employer]  Current Medications:   Aspirin, losartan, clopidogrel, omprezole, atovastatin, metoprolol, metformin, claratan   Date Last Reviewed:  11/7/2018   Number of Personal Factors/Comorbidities that affect the Plan of Care: 0: LOW COMPLEXITY   EXAMINATION:   (Abbreviations: P-pain, NP- no pain, wnl-within normal limits)    Observation/Orthostatic Postural Assessment:    Relaxed standing posture:  Present in an ankle brace     Palpation/tone/tissue texture:    ASIS: n/a  PSIS:n/a  Leg Length: supine:n/a         Long Sitting: n/a      Soft tissue:  · Hip flexors:n/a  · Hamstrings: increased tightness on R  · IT band: na  · gastroc/soleus/posterior tibialis: increased tightness on R (improving)  · Anterior tibialis: Increased tightness on R   (improving)      Edema measurements:  Date:  9-26-18      Activity/Exercise Left  Right   Figure 8 54 cm 56 cm   Mid calf 41 cm 41cm       ROM:   Knee ROM Date:  9-26-18       Right Left   Flexion 130 (was 108 deg) 140   Extension 0 deg (was 15 deg) 0      Ankle ROM Date:  9-26-18       Right Left   DF 10 10   PF wnl wnl   Inversion - -   Eversion - -           Strength:     Hip Strength Date:  9-26-18       Right Left   Flexion 5 5   Extension 4+ 5   IR n/a n/a   ER n/a n/a   Abduction 5 5      Knee Strength Date:  9-26-18       Right Left   Flexion 4+ 5   Extension 4 5             Special Tests:   Stability tests:  Lachmans: (-)  Slocums:  AM: (-)          AL: (-)  Posterior drawer: (-)  Posterior Sag: (-)    Varus: 0 deg: n/a today   30 deg: n/a today  Valgus: 0 deg: n/a today   30 deg: n/a today    Meniscus cluster test  1. End range flexion: -  2. End range extension: (-)  3.  Joint line tenderness: medical joint line tenderness (mild)  4. Report of locking: (-)  5. McMurreys: n/a    Neurological Screen:   Myotomes: strong in bilateral LE's     Dermatomes: intact in bilateral LE's         Reflexes: n/a         Neural Tension Tests: -  Functional Mobility:    · Double leg squat: able to do a function squat  · Single leg squat:  L:n/a         R:n/a  · Gait Pattern: ambulates with decreased terminal stance on R  (improved)  Balance:  Single leg   L: 10 sec, minimal sway  R:3 sec, moderate sway        CLINICAL DECISION MAKING:   Outcome Measure: Tool Used: Lower Extremity Functional Scale (LEFS)  Score:  Initial: 27/80 Most Recent: 43/80 (Date: 9-26-18 )   Interpretation of Score: 20 questions each scored on a 5 point scale with 0 representing \"extreme difficulty or unable to perform\" and 4 representing \"no difficulty\". The lower the score, the greater the functional disability. 80/80 represents no disability. Minimal detectable change is 9 points. Score 80 79-63 62-48 47-32 31-16 15-1 0   Modifier CH CI CJ CK CL CM CN     Medical Necessity:   · Patient is expected to demonstrate progress in strength, range of motion and symptom levels to return to full function. Reason for Services/Other Comments:  · Patient continues to require skilled intervention due to ongoing symptoms. TREATMENT:   (In addition to Assessment/Re-Assessment sessions the following treatments were rendered)    Subjective Pre-Treatment Symptoms: Reports that his knee was sore on Friday afternoon and some on Saturday. Thinks it may have been related to walking too much. Therapeutic Exercise: (20 min) Done in order to improve strength, ROM and understanding of current condition.     Date  10-31-18 Date  11-5-18   Activity/Exercise     Education Discussed updated plan of care  Added cuboid stability pad Discussed HEP   Biking 10 min 10 min   Taping Taped cuboid into pronation  Taped cuboid into pronation, used 2 I strips of kinesio tape   ankle eversion 10x, red band Manual resistance, 10 sec end range hold, 10x,   Eccentric: 10x   Gait training discussed -   Terminal knee extension w/ step up · - -   Ankle proprioception  Wobble board, worked primarily on using evertors and DF for balance control   Calf stretch 60 sec 60 sec   Quad stretch 60 sec 60 sec   SLR 10x, 3 sets, no weight 10x, 3 sets   Manual Therapy: (30) Done in order to improve joint and soft tissue mobility,reduce muscle guarding, and decrease muscle tone  · Knee extension mobilization to tibia and femur and with gentle traction, grade III-IV  · Patella mobilization, superior-inferior, medial-lateral, distraction using suction   · Quad tendon and muscle elongation mobilization   · Talocrural posterior-medial glide to facilitated DF, in supine   · Calcaneal distraction with DF of the ankle  Modalities: ( 10 min) Done in order to reduce swelling and pain  Ice x 10 min  Treatment/Session Assessment: Mr. Joaquina Escudero still cannot tolerate any eccentric force on the knee so we decided to only do SLR's that are pain free in order to avoid any soreness. Also added a cuboid stability pad since he was painful with plantarflexion of it. · Pain: Initial:   Mild at rest Post Session:  Mild knee and ankle soreness reported ·   Compliance with Program/Exercises: compliant   · Recommendations/Intent for next treatment session: \"Next visit will focus on progressing the patients plan of care\".     Future Appointments   Date Time Provider Alan Plunkett   11/7/2018  7:00 PM Sachi Montes PT, DPT [facility]   11/12/2018 10:00 AM Sachi Montes PT, DPT [facility]   11/14/2018 10:00 AM Sachi Montes PT, DPT [facility]   11/19/2018 10:00 AM Sachi Montes PT, DPT [facility]   11/21/2018  9:45 AM Sachi Montse PT, DPT [facility]   11/26/2018 10:00 AM Sachi Montes PT, DPT [facility]   11/28/2018 10:00 AM Sachi Montes PT, DPT [facility] 12/3/2018 10:00 AM Gavi Rolling, PT, DPT [facility]   12/5/2018 10:00 AM Gavi Rolling, PT, DPT [facility]   12/10/2018 10:00 AM Gavi Rolling, PT, DPT [facility]   12/12/2018 10:00 AM [facility] Rolling[facility]   12/17/2018 10:00 AM [facility]   12/19/2018  9:45 AM [facility]     Total Treatment Duration:60min  PT Patient Time In/Time Out  Time In: 1000  Time Out: 800 Enrike Mota PT, DPT

## 2018-11-12 ENCOUNTER — HOSPITAL ENCOUNTER (OUTPATIENT)
Dept: PHYSICAL THERAPY | Age: 53
Discharge: HOME | End: 2018-11-12
Payer: COMMERCIAL

## 2018-11-12 PROCEDURE — 97110 THERAPEUTIC EXERCISES: CPT

## 2018-11-12 PROCEDURE — 97140 MANUAL THERAPY 1/> REGIONS: CPT

## 2018-11-12 NOTE — PROGRESS NOTES
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 5 = High Risk)  MD Orders: evaluate and treat MEDICAL/REFERRING DIAGNOSIS:Right ankle sprain,chondromalacia right knee  DATE OF ONSET:   REFERRING PHYSICIAN:Irvin, Severo Music, PA  RETURN PHYSICIAN APPOINTMENT: did not specify       ASSESSMENT:  Mr. Danny Osman has come for a total of 7 visits since starting physical therapy on 18. During that time he reports a 50-60% overall improvement and displays objective improvements with knee and ankle ROM, decreased edema around the ankle and calf and overall better function. He still cannot tolerate higher level weightbearing exercises such as squats and step ups but continues to progress towards that. Skilled physical therapy is recommended to continue for 2x/wk for 4 wks in order to get him to a high level of function with minimal symptoms. PROBLEM LIST (Impacting functional limitations):  1. Decreased Strength  2. Decreased ADL/Functional Activities  3. Increased Pain  4. Decreased Activity Tolerance  5. Decreased Flexibility/Joint Mobility  6. Decreased [place] with Home Exercise Program INTERVENTIONS PLANNED:  1. Cold  2. Heat  3. Home Exercise Program (HEP)  4. Manual Therapy  5. Range of Motion (ROM)  6. Therapeutic Exercise/Strengthening     TREATMENT PLAN:  Effective Dates: 18 TO 18   Frequency/Duration: 2 times a week for 4 wks  GOALS: (Goals have been discussed and agreed upon with patient.)  SHORT-TERM FUNCTIONAL GOALS: Time Frame: 2-4 wks  1.   Patient will report 25-50% reduction in overall symptoms (MET)  2. Patient will be compliant with HEP and plan of care (MET)  3. Patient will have 0-125 deg ROM of the knee (MET)   4. Patient will improve swelling by 1 cm on the figure 8 measurement  (MET)  5. Patient will improve on the LEFS by 5-7 points (MET)  DISCHARGE GOALS: Time Frame: 10-12 wk  1. Patient will report % reduction in overall symptoms in order to be able to have full function with decreased symptoms (ongoing)  2. Patient will be able to squat with minimal pain as needed at his job with minimal pain (0-2/10) (ongoing)   3. Patient will be able to ambulate with a symmetrical gait pattern as judged by therapist in order to able to perform activities of daily living (ongoing)  4. Patient will score 60 or greater on the LEFS points in order to demonstrate improved function with less pain    Rehabilitation Potential For Stated Goals: Good    Regarding Shemar Lyons therapy, I certify that the treatment plan above will be carried out by a therapist or under their direction. Thank you for this referral,  Shirlene Montenegro PT,DPT    Referring Physician Signature: SHAWN Renee _____________________________________________________ Date: __________________________________          HISTORY:   History of Present Injury/Illness (Reason for Referral): Reports that he fell at work on 8-1-18. He had puncture wounds in his lower leg. He had a lot of bleeding during that time requiring stiches in one of the wounds. He had an MRI on his knee revealed a meniscus tear. [facility] [address] [place] [address] [facility] ED where he got stiches for his wounds. The next day he went to the [facility] doctor and was then referred to Dr. Rina Sousa. He also had hand tingling afterwards. He was put on a blood thinner to prevent blood clots after having a negative ultrasound. He had a steroid shot in the knee with minimal benefit at this point. No numbness in the foot reported.   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 2 stents, diabetes type II   Social History/Living Environment: lives in a house with family  Prior Level of Function/Work/Activity:independent / works at [employer]  Current Medications:   Aspirin, losartan, clopidogrel, omprezole, atovastatin, metoprolol, metformin, claratan   Date Last Reviewed:  11/12/2018   Number of Personal Factors/Comorbidities that affect the Plan of Care: 0: LOW COMPLEXITY   EXAMINATION:   (Abbreviations: P-pain, NP- no pain, wnl-within normal limits)    Observation/Orthostatic Postural Assessment:    Relaxed standing posture:  Present in an ankle brace     Palpation/tone/tissue texture:    ASIS: n/a  PSIS:n/a  Leg Length: supine:n/a         Long Sitting: n/a      Soft tissue:  · Hip flexors:n/a  · Hamstrings: increased tightness on R  · IT band: na  · gastroc/soleus/posterior tibialis: increased tightness on R (improving)  · Anterior tibialis: Increased tightness on R   (improving)      Edema measurements:  Date:  9-26-18      Activity/Exercise Left  Right   Figure 8 54 cm 56 cm   Mid calf 41 cm 41cm       ROM:   Knee ROM Date:  9-26-18       Right Left   Flexion 130 (was 108 deg) 140   Extension 0 deg (was 15 deg) 0      Ankle ROM Date:  9-26-18       Right Left   DF 10 10   PF wnl wnl   Inversion - -   Eversion - -           Strength:     Hip Strength Date:  9-26-18       Right Left   Flexion 5 5   Extension 4+ 5   IR n/a n/a   ER n/a n/a   Abduction 5 5      Knee Strength Date:  9-26-18       Right Left   Flexion 4+ 5   Extension 4 5             Special Tests:   Stability tests:  Lachmans: (-)  Slocums:  AM: (-)          AL: (-)  Posterior drawer: (-)  Posterior Sag: (-)    Varus: 0 deg: n/a today   30 deg: n/a today  Valgus: 0 deg: n/a today   30 deg: n/a today    Meniscus cluster test  1. End range flexion: -  2. End range extension: (-)  3.  Joint line tenderness: medical joint line tenderness (mild)  4. Report of locking: (-)  5. McMurreys: n/a    Neurological Screen:   Myotomes: strong in bilateral LE's     Dermatomes: intact in bilateral LE's         Reflexes: n/a         Neural Tension Tests: -  Functional Mobility:    · Double leg squat: able to do a function squat  · Single leg squat:  L:n/a         R:n/a  · Gait Pattern: ambulates with decreased terminal stance on R  (improved)  Balance:  Single leg   L: 10 sec, minimal sway  R:3 sec, moderate sway        CLINICAL DECISION MAKING:   Outcome Measure: Tool Used: Lower Extremity Functional Scale (LEFS)  Score:  Initial: 27/80 Most Recent: 43/80 (Date: 9-26-18 )   Interpretation of Score: 20 questions each scored on a 5 point scale with 0 representing \"extreme difficulty or unable to perform\" and 4 representing \"no difficulty\". The lower the score, the greater the functional disability. 80/80 represents no disability. Minimal detectable change is 9 points. Score 80 79-63 62-48 47-32 31-16 15-1 0   Modifier CH CI CJ CK CL CM CN     Medical Necessity:   · Patient is expected to demonstrate progress in strength, range of motion and symptom levels to return to full function. Reason for Services/Other Comments:  · Patient continues to require skilled intervention due to ongoing symptoms. TREATMENT:   (In addition to Assessment/Re-Assessment sessions the following treatments were rendered)    Subjective Pre-Treatment Symptoms: Reports that his foot feels better with the tape and the pad in the shoe. Knee is about the same. Therapeutic Exercise: (20 min) Done in order to improve strength, ROM and understanding of current condition.     Date  11-5-18 Date  11-12-18   Activity/Exercise     Education Discussed HEP Discussed HEP   Biking 10 min 10 min   Taping Taped cuboid into pronation, used 2 I strips of kinesio tape Taped cuboid into pronation, used 2 I strips of kinesio tape  Taped patella into distraction using kinesio tape   ankle eversion Manual resistance, 10 sec end range hold, 10x,   Eccentric: 10x HEP   Gait training - -   Terminal knee extension w/ step up - -   Ankle proprioception Wobble board, worked primarily on using evertors and DF for balance control SL balance, 10 sec, 5x   Calf stretch 60 sec 60 sec   Quad stretch 60 sec 60 sec   SLR 10x, 3 sets -   Manual Therapy: (30) Done in order to improve joint and soft tissue mobility,reduce muscle guarding, and decrease muscle tone  · Knee extension mobilization to tibia and femur and with gentle traction, grade III-IV  · Patella mobilization, superior-inferior, medial-lateral, distraction using suction and lateral tilt  · Quad tendon and muscle elongation mobilization   · Talocrural posterior-medial glide to facilitated DF, in supine   · Calcaneal distraction with DF of the ankle  · Midfoot mobilization while keeping cuboid stable   Modalities: ( 10 min) Done in order to reduce swelling and pain  Ice x 10 min  Treatment/Session Assessment: Mr. Michael Berry ROM is doing well. Still unable to load the knee without patella femoral pain. Cuboid joint feels less tender. Discussed to continue taping it for the time being. · Pain: Initial:   Mild at rest Post Session:  Mild knee soreness reported ·   Compliance with Program/Exercises: compliant   · Recommendations/Intent for next treatment session: \"Next visit will focus on progressing the patients plan of care\".     Future Appointments   Date Time Provider Alan Plunkett   11/14/2018 10:00 AM [facility]   11/19/2018 10:00 AM Kenya Andujar PT, DPT [facility]   11/21/2018  9:45 AM Kenya Andujar PT, DPT [facility]   11/26/2018 10:00 AM Kenya Andujar PT, DPT [facility]   11/28/2018 10:00 AM Kenya Andujar PT, DPT [facility]   12/3/2018 10:00 AM Kenya Andujar PT, DPT [facility]   12/5/2018 10:00 AM Kenya Andujar PT, DPT [facility] 12/10/2018 10:00 AM Lars Fleming PT, DPT [facility]   12/12/2018 10:00 AM Lars Fleming PT, DPT [facility]   12/17/2018 10:00 AM Lars Fleming PT, DPT [facility]   12/19/2018  9:45 AM Lars Fleming PT, DPT [facility]     Total Treatment Duration:65min  PT Patient Time In/Time Out  Time In: 1005  Time Out: 202 Fred Ramirez PT, DPT

## 2018-11-14 ENCOUNTER — HOSPITAL ENCOUNTER (OUTPATIENT)
Dept: PHYSICAL THERAPY | Age: 53
Discharge: HOME | End: 2018-11-14
Payer: COMMERCIAL

## 2018-11-14 PROCEDURE — 97140 MANUAL THERAPY 1/> REGIONS: CPT

## 2018-11-14 PROCEDURE — 97110 THERAPEUTIC EXERCISES: CPT

## 2018-11-14 NOTE — PROGRESS NOTES
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 5 = High Risk)  MD Orders: evaluate and treat MEDICAL/REFERRING DIAGNOSIS:Right ankle sprain,chondromalacia right knee  DATE OF ONSET:   REFERRING PHYSICIAN:Emeka Deluna PA  RETURN PHYSICIAN APPOINTMENT: did not specify       ASSESSMENT:  Mr. Milka Call has come for a total of 7 visits since starting physical therapy on 18. During that time he reports a 50-60% overall improvement and displays objective improvements with knee and ankle ROM, decreased edema around the ankle and calf and overall better function. He still cannot tolerate higher level weightbearing exercises such as squats and step ups but continues to progress towards that. Skilled physical therapy is recommended to continue for 2x/wk for 4 wks in order to get him to a high level of function with minimal symptoms. PROBLEM LIST (Impacting functional limitations):  1. Decreased Strength  2. Decreased ADL/Functional Activities  3. Increased Pain  4. Decreased Activity Tolerance  5. Decreased Flexibility/Joint Mobility  6. Decreased [place] with Home Exercise Program INTERVENTIONS PLANNED:  1. Cold  2. Heat  3. Home Exercise Program (HEP)  4. Manual Therapy  5. Range of Motion (ROM)  6. Therapeutic Exercise/Strengthening     TREATMENT PLAN:  Effective Dates: 18 TO 18   Frequency/Duration: 2 times a week for 4 wks  GOALS: (Goals have been discussed and agreed upon with patient.)  SHORT-TERM FUNCTIONAL GOALS: Time Frame: 2-4 wks  1.   Patient will report 25-50% reduction in overall symptoms (MET)  2. Patient will be compliant with HEP and plan of care (MET)  3. Patient will have 0-125 deg ROM of the knee (MET)   4. Patient will improve swelling by 1 cm on the figure 8 measurement  (MET)  5. Patient will improve on the LEFS by 5-7 points (MET)  DISCHARGE GOALS: Time Frame: 10-12 wk  1. Patient will report % reduction in overall symptoms in order to be able to have full function with decreased symptoms (ongoing)  2. Patient will be able to squat with minimal pain as needed at his job with minimal pain (0-2/10) (ongoing)   3. Patient will be able to ambulate with a symmetrical gait pattern as judged by therapist in order to able to perform activities of daily living (ongoing)  4. Patient will score 60 or greater on the LEFS points in order to demonstrate improved function with less pain    Rehabilitation Potential For Stated Goals: Good    Regarding Monica Perez therapy, I certify that the treatment plan above will be carried out by a therapist or under their direction. Thank you for this referral,  Yunior Darby PT,DPT    Referring Physician Signature: SHAWN Mendes _____________________________________________________ Date: __________________________________          HISTORY:   History of Present Injury/Illness (Reason for Referral): Reports that he fell at work on 8-1-18. He had puncture wounds in his lower leg. He had a lot of bleeding during that time requiring stiches in one of the wounds. He had an MRI on his knee revealed a meniscus tear. He went to the medical facility at [address] [place] [address] & [facility] and they sent him to the ED where he got stiches for his wounds. The next day he went to the [facility] doctor and was then referred to Dr. Sylvia Santos. He also had hand tingling afterwards. He was put on a blood thinner to prevent blood clots after having a negative ultrasound. He had a steroid shot in the knee with minimal benefit at this point. No numbness in the foot reported.   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 2 stents, diabetes type II   Social History/Living Environment: lives in a house with family  Prior Level of Function/Work/Activity:independent / works at [employer]  Current Medications:   Aspirin, losartan, clopidogrel, omprezole, atovastatin, metoprolol, metformin, claratan   Date Last Reviewed:  11/14/2018   Number of Personal Factors/Comorbidities that affect the Plan of Care: 0: LOW COMPLEXITY   EXAMINATION:   (Abbreviations: P-pain, NP- no pain, wnl-within normal limits)    Observation/Orthostatic Postural Assessment:    Relaxed standing posture:  Present in an ankle brace     Palpation/tone/tissue texture:    ASIS: n/a  PSIS:n/a  Leg Length: supine:n/a         Long Sitting: n/a      Soft tissue:  · Hip flexors:n/a  · Hamstrings: increased tightness on R  · IT band: na  · gastroc/soleus/posterior tibialis: increased tightness on R (improving)  · Anterior tibialis: Increased tightness on R   (improving)      Edema measurements:  Date:  9-26-18      Activity/Exercise Left  Right   Figure 8 54 cm 56 cm   Mid calf 41 cm 41cm       ROM:   Knee ROM Date:  9-26-18       Right Left   Flexion 130 (was 108 deg) 140   Extension 0 deg (was 15 deg) 0      Ankle ROM Date:  9-26-18       Right Left   DF 10 10   PF wnl wnl   Inversion - -   Eversion - -           Strength:     Hip Strength Date:  9-26-18       Right Left   Flexion 5 5   Extension 4+ 5   IR n/a n/a   ER n/a n/a   Abduction 5 5      Knee Strength Date:  9-26-18       Right Left   Flexion 4+ 5   Extension 4 5             Special Tests:   Stability tests:  Lachmans: (-)  Slocums:  AM: (-)          AL: (-)  Posterior drawer: (-)  Posterior Sag: (-)    Varus: 0 deg: n/a today   30 deg: n/a today  Valgus: 0 deg: n/a today   30 deg: n/a today    Meniscus cluster test  1. End range flexion: -  2. End range extension: (-)  3.  Joint line tenderness: medical joint line tenderness (mild)  4. Report of locking: (-)  5. McMurreys: n/a    Neurological Screen:   Myotomes: strong in bilateral LE's     Dermatomes: intact in bilateral LE's         Reflexes: n/a         Neural Tension Tests: -  Functional Mobility:    · Double leg squat: able to do a function squat  · Single leg squat:  L:n/a         R:n/a  · Gait Pattern: ambulates with decreased terminal stance on R  (improved)  Balance:  Single leg   L: 10 sec, minimal sway  R:3 sec, moderate sway        CLINICAL DECISION MAKING:   Outcome Measure: Tool Used: Lower Extremity Functional Scale (LEFS)  Score:  Initial: 27/80 Most Recent: 43/80 (Date: 9-26-18 )   Interpretation of Score: 20 questions each scored on a 5 point scale with 0 representing \"extreme difficulty or unable to perform\" and 4 representing \"no difficulty\". The lower the score, the greater the functional disability. 80/80 represents no disability. Minimal detectable change is 9 points. Score 80 79-63 62-48 47-32 31-16 15-1 0   Modifier CH CI CJ CK CL CM CN     Medical Necessity:   · Patient is expected to demonstrate progress in strength, range of motion and symptom levels to return to full function. Reason for Services/Other Comments:  · Patient continues to require skilled intervention due to ongoing symptoms. TREATMENT:   (In addition to Assessment/Re-Assessment sessions the following treatments were rendered)    Subjective Pre-Treatment Symptoms: Reports that his foot is feeling better but pain still there. Knee felt a little sore after last visit. Therapeutic Exercise: (30 min) Done in order to improve strength, ROM and understanding of current condition.     Date  11-12-18 Date  11-14-18   Activity/Exercise     Education Discussed HEP Discussed HEP   Biking 10 min 10 min   Taping Taped cuboid into pronation, used 2 I strips of kinesio tape  Taped patella into distraction using kinesio tape · Taped cuboid into pronation, used 2 I strips of kinesio tape  · Taped patella into distraction using kinesio tape   ankle eversion HEP Blue band, 10x, 3 sets   Gait training - HEP   Terminal knee extension w/ step up - 2\", 10x, 3 sets   Ankle proprioception SL balance, 10 sec, 5x HEP   Calf stretch 60 sec 60 sec   Quad stretch 60 sec 60 sec   SLR - 10x, 3 sets   Manual Therapy: (20) Done in order to improve joint and soft tissue mobility,reduce muscle guarding, and decrease muscle tone  · Knee extension mobilization to tibia and femur and with gentle traction, grade III-IV (NOT DONE TODAY)  · Patella mobilization, superior-inferior, medial-lateral, distraction using suction and lateral tilt  · Quad tendon and muscle elongation mobilization   · Talocrural posterior-medial glide to facilitated DF, in supine   · Calcaneal distraction with DF of the ankle  · Midfoot mobilization while keeping cuboid stable   Modalities: ( 10 min) Done in order to reduce swelling and pain  Ice x 10 min  Treatment/Session Assessment: Mr. Mateo Thomason tolerated the session well. We started loading the knee joint more today which he had minor pain with. We will see if he can tolerate this. · Pain: Initial:   Mild at rest Post Session:  Mild knee soreness reported ·   Compliance with Program/Exercises: compliant   · Recommendations/Intent for next treatment session: \"Next visit will focus on progressing the patients plan of care\".     Future Appointments   Date Time Provider Alan Plunkett   11/19/2018 10:00 AM Jose Alberts PT, DPT [facility]   11/21/2018  9:45 AM Jose Alberts PT DPT [facility]   11/26/2018 10:00 AM Jose Alberts PT DPT [facility]   11/28/2018 10:00 AM Jose Alberts PT DPT [facility]   12/3/2018 10:00 AM Jose Alberts PT DPT [facility]   12/5/2018 10:00 AM Jose Alberts PT, DPT [facility]   12/10/2018 10:00 AM Jose Alberts PT DPT [facility]   12/12/2018 10:00 AM Jose Alberts PT, RUPERTO [facility]   12/17/2018 10:00 AM Jonathan Pisano, PT, DPUZMA [facility]   12/19/2018  9:45 AM Jonathan Pisano PT, DPT [facility]     Total Treatment Duration:60min  PT Patient Time In/Time Out  Time In: 1005  Time Out: [address] PT, DPT

## 2018-11-19 ENCOUNTER — HOSPITAL ENCOUNTER (OUTPATIENT)
Dept: PHYSICAL THERAPY | Age: 53
Discharge: HOME | End: 2018-11-19
Payer: COMMERCIAL

## 2018-11-19 PROCEDURE — 97110 THERAPEUTIC EXERCISES: CPT

## 2018-11-19 PROCEDURE — 97140 MANUAL THERAPY 1/> REGIONS: CPT

## 2018-11-19 PROCEDURE — 97164 PT RE-EVAL EST PLAN CARE: CPT

## 2018-11-19 NOTE — PROGRESS NOTES
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 5 = High Risk)  MD Orders: evaluate and treat MEDICAL/REFERRING DIAGNOSIS:Right ankle sprain,chondromalacia right knee  DATE OF ONSET:   REFERRING PHYSICIAN:Mayco Deluna PA  RETURN PHYSICIAN APPOINTMENT: did not specify       ASSESSMENT:  Mr. Agnes Hall has come for a total of 17  visits since starting physical therapy on 18. During that time he reports a 50-60% overall improvement and has full ROM of the knee and ankle. His knee pain seems to be most related to patella compression and the lateral foot pain is mostly due to cuboid instability and peroneal tendinopath. He continues to not be able to tolerate squatting activities. Using taping to distract the patella and stabilize the cuboid helps some of the pain but does not fully take it away. At this point bracing should be considered to mimic the taping and potentially other medical interventions since significant improvements have not been seen in the last couple weeks. Skilled physical therapy is recommended to continue progressing his plan of care. PROBLEM LIST (Impacting functional limitations):  1. Decreased Strength  2. Decreased ADL/Functional Activities  3. Increased Pain  4. Decreased Activity Tolerance  5. Decreased Flexibility/Joint Mobility  6. Decreased [place] with Home Exercise Program INTERVENTIONS PLANNED:  1. Cold  2. Heat  3. Home Exercise Program (HEP)  4. Manual Therapy  5. Range of Motion (ROM)  6.  Therapeutic Exercise/Strengthening     TREATMENT PLAN:  Effective Dates:  18 TO 1-19-19  Frequency/Duration: 2 times a week for 8 wks  GOALS: (Goals have been discussed and agreed upon with patient.)  SHORT-TERM FUNCTIONAL GOALS: Time Frame: 2-4 wks  1. Patient will report 25-50% reduction in overall symptoms (MET)  2. Patient will be compliant with HEP and plan of care (MET)  3. Patient will have 0-125 deg ROM of the knee (MET)   4. Patient will improve swelling by 1 cm on the figure 8 measurement  (MET)  5. Patient will improve on the LEFS by 5-7 points (MET)  DISCHARGE GOALS: Time Frame: 10-12 wk  1. Patient will report % reduction in overall symptoms in order to be able to have full function with decreased symptoms (ongoing)  2. Patient will be able to squat with minimal pain as needed at his job with minimal pain (0-2/10) (ongoing)   3. Patient will be able to ambulate with a symmetrical gait pattern as judged by therapist in order to able to perform activities of daily living (ongoing)  4. Patient will score 60 or greater on the LEFS points in order to demonstrate improved function with less pain    Rehabilitation Potential For Stated Goals: Good    Regarding Shemar Lyons therapy, I certify that the treatment plan above will be carried out by a therapist or under their direction. Thank you for this referral,  Shirlene Montenegro PT,DPT    Referring Physician Signature: SHAWN Renee _____________________________________________________ Date: __________________________________          HISTORY:   History of Present Injury/Illness (Reason for Referral): Reports that he fell at work on 8-1-18. He had puncture wounds in his lower leg. He had a lot of bleeding during that time requiring stiches in one of the wounds. He had an MRI on his knee revealed a meniscus tear. He went to the medical facility at [facility] and they sent him to the ED where he got stiches for his wounds. The next day he went to the [facility] doctor and was then referred to Dr. Rina Sousa.  He also had hand tingling afterwards. He was put on a blood thinner to prevent blood clots after having a negative ultrasound. He had a steroid shot in the knee with minimal benefit at this point. No numbness in the foot reported. 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 2 stents, diabetes type II   Social History/Living Environment: lives in a house with family  Prior Level of Function/Work/Activity:independent / works at [employer]  Current Medications:   Aspirin, losartan, clopidogrel, omprezole, atovastatin, metoprolol, metformin, claratan   Date Last Reviewed:  11/19/2018   Number of Personal Factors/Comorbidities that affect the Plan of Care: 0: LOW COMPLEXITY   EXAMINATION:   (Abbreviations: P-pain, NP- no pain, wnl-within normal limits)    Observation/Orthostatic Postural Assessment:    Relaxed standing posture:  -     Palpation/tone/tissue texture:    ASIS: n/a  PSIS:n/a  Leg Length: supine:n/a         Long Sitting: n/a      Soft tissue:  · Hip flexors:mild tightness in R quad  · Hamstrings: increased tightness on R  · IT band: na  · gastroc/soleus/posterior tibialis: increased tightness on R (improving)  · Anterior tibialis: Increased tightness on R   (improved)    ROM:   Knee ROM Date:  11-19-18       Right Left   Flexion 135 140   Extension 0 deg  0      Ankle ROM Date:  11-19-18       Right Left   DF 10 10   PF wnl wnl   Inversion - -   Eversion - -           Strength:     Hip Strength Date:  11-19-18       Right Left   Flexion 5 5   Extension 5 5   IR n/a n/a   ER n/a n/a   Abduction 5 5      Knee Strength Date:  11-19-18       Right Left   Flexion 5 5   Extension 4 5             Special Tests:   Stability tests:  Lachmans: (-)  Slocums:  AM: (-)          AL: (-)  Posterior drawer: (-)  Posterior Sag: (-)    Varus: 0 deg: n/a today   30 deg: n/a today  Valgus: 0 deg: n/a today   30 deg: n/a today    Meniscus cluster test  1. End range flexion: -  2.  End range extension: (-)  3. Joint line tenderness: medical joint line tenderness  (mild)  4. Report of locking: (-)  5. McMurreys: n/a    Neurological Screen:   Myotomes: strong in bilateral LE's     Dermatomes: intact in bilateral LE's         Reflexes: n/a         Neural Tension Tests: -  Functional Mobility:    · Double leg squat: able to do a function squat (does report increased pain with it  · Single leg squat:  L:functional        R: able to do a partial squat with increased pain  · Gait Pattern: ambulates with decreased terminal stance on R  (improved)  Balance:  Single leg   L: 10 sec, minimal sway  R: 10 sec, minimal sway        CLINICAL DECISION MAKING:   Outcome Measure: Tool Used: Lower Extremity Functional Scale (LEFS)  Score:  Initial: 27/80 43/80 (Date: 9-26-18 ) Most Recent: 36/80 (Date 11-19-18)   Interpretation of Score: 20 questions each scored on a 5 point scale with 0 representing \"extreme difficulty or unable to perform\" and 4 representing \"no difficulty\". The lower the score, the greater the functional disability. 80/80 represents no disability. Minimal detectable change is 9 points. Score 80 79-63 62-48 47-32 31-16 15-1 0   Modifier CH CI CJ CK CL CM CN     Medical Necessity:   · Patient is expected to demonstrate progress in strength, range of motion and symptom levels to return to full function. Reason for Services/Other Comments:  · Patient continues to require skilled intervention due to ongoing symptoms. TREATMENT:   (In addition to Assessment/Re-Assessment sessions the following treatments were rendered)    Subjective Pre-Treatment Symptoms: Reports that his foot and knee have minor soreness. Overall reports 50-60% improvement in ankle and knee. Therapeutic Exercise: (25 min) Done in order to improve strength, ROM and understanding of current condition.     Date  11-14-18 Date  11-19-18   Activity/Exercise     Education Discussed HEP Discussed HEP   Biking 10 min 10 min    Taping · Taped cuboid into pronation, used 2 I strips of kinesio tape  · Taped patella into distraction using kinesio tape · Taped cuboid into pronation, used 2 I strips of kinesio tape  · Taped patella into distraction using kinesio tape   ankle eversion Blue band, 10x, 3 sets Manual resistance, 10 sec, 5x   Gait training HEP [facility]   Terminal knee extension w/ step up 2\", 10x, 3 sets 2\", 10x (still had soreness with this)   Ankle proprioception HEP Wobble board, worked on eversion stabilization, 5 min total   Calf stretch 60 sec 60 sec   Quad stretch 60 sec 60 sec   SLR 10x, 3 sets 10x, 3 sets   Manual Therapy: (15) Done in order to improve joint and soft tissue mobility,reduce muscle guarding, and decrease muscle tone  · Knee extension mobilization to tibia and femur and with gentle traction, grade III-IV (NOT DONE TODAY)  · Patella mobilization, superior-inferior, medial-lateral, distraction using suction and lateral tilt, done at 0, 15 and 90 deg flex  · Quad tendon and vastus lateralis elongation mobilization   · Talocrural posterior-medial glide to facilitated DF, in supine   · Calcaneal distraction with DF of the ankle  · Midfoot mobilization while keeping cuboid stable   · Cuboid plantarflexion mobilization   Modalities: ( 10 min) Done in order to reduce swelling and pain  Ice x 10 min  Treatment/Session Assessment: Mr. Jeffrey King tolerated the session well. Continuing to be in relatively the same area. Motion is improved tremendously and knee cap even moves really well; however, he cannot tolerate any squatting yet. · Pain: Initial:   Mild at rest, increases with squatting Post Session:  Mild knee soreness reported ·   Compliance with Program/Exercises: compliant   · Recommendations/Intent for next treatment session: \"Next visit will focus on progressing the patients plan of care\".     Future Appointments   Date Time Provider Alan Plunkett   11/21/2018  9:45 AM Guillermina Davey PT, DPT [facility]   11/26/2018 10:00 AM Teto Blade, PT, DPT [facility]   11/28/2018 10:00 AM Teto Blade, PT, DPT [facility]   12/3/2018 10:00 AM Teto Blade, PT, DPT [facility]   12/5/2018 10:00 AM Teto Blade, PT, DPT [facility]   12/10/2018 10:00 AM Teto Blade, PT, DPT [facility]   12/12/2018 10:00 AM Teto Blade, PT, DPT [facility]   12/17/2018 10:00 AM Teto Blade, PT, DPT [facility]   12/19/2018  9:45 AM Teto Blade, PT, DPT [facility]     Total Treatment Duration:50min, re-eval 10 min  PT Patient Time In/Time Out  Time In: 1000  Time Out: 800 Enrike Mota PT, DPUZMA

## 2018-11-21 ENCOUNTER — HOSPITAL ENCOUNTER (OUTPATIENT)
Dept: PHYSICAL THERAPY | Age: 53
Discharge: HOME | End: 2018-11-21
Payer: COMMERCIAL

## 2018-11-21 PROCEDURE — 97110 THERAPEUTIC EXERCISES: CPT

## 2018-11-21 PROCEDURE — 97140 MANUAL THERAPY 1/> REGIONS: CPT

## 2018-11-21 NOTE — PROGRESS NOTES
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 5 = High Risk)  MD Orders: evaluate and treat MEDICAL/REFERRING DIAGNOSIS:Right ankle sprain,chondromalacia right knee  DATE OF ONSET:   REFERRING PHYSICIAN:Cipriano Deluna PA  RETURN PHYSICIAN APPOINTMENT: did not specify       ASSESSMENT:  Mr. Maulik Manrique has come for a total of 17  visits since starting physical therapy on 18. During that time he reports a 50-60% overall improvement and has full ROM of the knee and ankle. His knee pain seems to be most related to patella compression and the lateral foot pain is mostly due to cuboid instability and peroneal tendinopath. He continues to not be able to tolerate squatting activities. Using taping to distract the patella and stabilize the cuboid helps some of the pain but does not fully take it away. At this point bracing should be considered to mimic the taping and potentially other medical interventions since significant improvements have not been seen in the last couple weeks. Skilled physical therapy is recommended to continue progressing his plan of care. PROBLEM LIST (Impacting functional limitations):  1. Decreased Strength  2. Decreased ADL/Functional Activities  3. Increased Pain  4. Decreased Activity Tolerance  5. Decreased Flexibility/Joint Mobility  6. Decreased [place] with Home Exercise Program INTERVENTIONS PLANNED:  1. Cold  2. Heat  3. Home Exercise Program (HEP)  4. Manual Therapy  5. Range of Motion (ROM)  6.  Therapeutic Exercise/Strengthening     TREATMENT PLAN:  Effective Dates:  18 TO 19  Frequency/Duration: 2 times a week for 8 wks  GOALS: (Goals have been discussed and agreed upon with patient.)  SHORT-TERM FUNCTIONAL GOALS: Time Frame: 2-4 wks  1. Patient will report 25-50% reduction in overall symptoms (MET)  2. Patient will be compliant with HEP and plan of care (MET)  3. Patient will have 0-125 deg ROM of the knee (MET)   4. Patient will improve swelling by 1 cm on the figure 8 measurement  (MET)  5. Patient will improve on the LEFS by 5-7 points (MET)  DISCHARGE GOALS: Time Frame: 10-12 wk  1. Patient will report % reduction in overall symptoms in order to be able to have full function with decreased symptoms (ongoing)  2. Patient will be able to squat with minimal pain as needed at his job with minimal pain (0-2/10) (ongoing)   3. Patient will be able to ambulate with a symmetrical gait pattern as judged by therapist in order to able to perform activities of daily living (ongoing)  4. Patient will score 60 or greater on the LEFS points in order to demonstrate improved function with less pain    Rehabilitation Potential For Stated Goals: Good    Regarding Luis Enrique Suhail therapy, I certify that the treatment plan above will be carried out by a therapist or under their direction. Thank you for this referral,  Marli Loya PT,DPT              HISTORY:   History of Present Injury/Illness (Reason for Referral): Reports that he fell at work on 8-1-18. He had puncture wounds in his lower leg. He had a lot of bleeding during that time requiring stiches in one of the wounds. He had an MRI on his knee revealed a meniscus tear. He went to the medical facility at [address] [place] [address] and they sent him to the ED where he got stiches for his wounds. The next day he went to the [facility] doctor and was then referred to Dr. Dior Carrero. He also had hand tingling afterwards. He was put on a blood thinner to prevent blood clots after having a negative ultrasound. He had a steroid shot in the knee with minimal benefit at this point.  No numbness in the foot reported. 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 2 stents, diabetes type II   Social History/Living Environment: lives in a house with family  Prior Level of Function/Work/Activity:independent / works at [employer]  Current Medications:   Aspirin, losartan, clopidogrel, omprezole, atovastatin, metoprolol, metformin, claratan   Date Last Reviewed:  11/21/2018   Number of Personal Factors/Comorbidities that affect the Plan of Care: 0: LOW COMPLEXITY   EXAMINATION:   (Abbreviations: P-pain, NP- no pain, wnl-within normal limits)    Observation/Orthostatic Postural Assessment:    Relaxed standing posture:  -     Palpation/tone/tissue texture:    ASIS: n/a  PSIS:n/a  Leg Length: supine:n/a         Long Sitting: n/a      Soft tissue:  · Hip flexors:mild tightness in R quad  · Hamstrings: increased tightness on R  · IT band: na  · gastroc/soleus/posterior tibialis: increased tightness on R (improving)  · Anterior tibialis: Increased tightness on R   (improved)    ROM:   Knee ROM Date:  11-19-18       Right Left   Flexion 135 140   Extension 0 deg  0      Ankle ROM Date:  11-19-18       Right Left   DF 10 10   PF wnl wnl   Inversion - -   Eversion - -           Strength:     Hip Strength Date:  11-19-18       Right Left   Flexion 5 5   Extension 5 5   IR n/a n/a   ER n/a n/a   Abduction 5 5      Knee Strength Date:  11-19-18       Right Left   Flexion 5 5   Extension 4 5             Special Tests:   Stability tests:  Lachmans: (-)  Slocums:  AM: (-)          AL: (-)  Posterior drawer: (-)  Posterior Sag: (-)    Varus: 0 deg: n/a today   30 deg: n/a today  Valgus: 0 deg: n/a today   30 deg: n/a today    Meniscus cluster test  1. End range flexion: -  2. End range extension: (-)  3. Joint line tenderness: medical joint line tenderness  (mild)  4. Report of locking: (-)  5.  McMurreys: n/a    Neurological Screen:   Myotomes: strong in bilateral LE's Dermatomes: intact in bilateral LE's         Reflexes: n/a         Neural Tension Tests: -  Functional Mobility:    · Double leg squat: able to do a function squat (does report increased pain with it  · Single leg squat:  L:functional        R: able to do a partial squat with increased pain  · Gait Pattern: ambulates with decreased terminal stance on R  (improved)  Balance:  Single leg   L: 10 sec, minimal sway  R: 10 sec, minimal sway        CLINICAL DECISION MAKING:   Outcome Measure: Tool Used: Lower Extremity Functional Scale (LEFS)  Score:  Initial: 27/80 43/80 (Date: 9-26-18 ) Most Recent: 36/80 (Date 11-19-18)   Interpretation of Score: 20 questions each scored on a 5 point scale with 0 representing \"extreme difficulty or unable to perform\" and 4 representing \"no difficulty\". The lower the score, the greater the functional disability. 80/80 represents no disability. Minimal detectable change is 9 points. Score 80 79-63 62-48 47-32 31-16 15-1 0   Modifier CH CI CJ CK CL CM CN     Medical Necessity:   · Patient is expected to demonstrate progress in strength, range of motion and symptom levels to return to full function. Reason for Services/Other Comments:  · Patient continues to require skilled intervention due to ongoing symptoms. TREATMENT:   (In addition to Assessment/Re-Assessment sessions the following treatments were rendered)    Subjective Pre-Treatment Symptoms: Reports that he is doing pretty good. States that he is feeling some better. Therapeutic Exercise: (30 min) Done in order to improve strength, ROM and understanding of current condition.     Date  11-19-18 Date  11-21-18   Activity/Exercise     Education Discussed HEP Discussed HEP   Biking 10 min  10 min   Taping · Taped cuboid into pronation, used 2 I strips of kinesio tape  · Taped patella into distraction using kinesio tape · Taped cuboid into pronation, used 2 I strips of kinesio tape  · Taped patella into distraction using kinesio tape   ankle eversion Manual resistance, 10 sec, 5x Manual resistance, 10 sec,5x   Gait training HEP    Terminal knee extension w/ step up 2\", 10x (still had soreness with this) 2\", 10x, 1 set, 5x-1 set    Ankle proprioception Wobble board, worked on eversion stabilization, 5 min total Wobble board, worked on eversion stabilization, 5 min total   Calf stretch 60 sec 60 sec   Quad stretch 60 sec 60 sec   SLR 10x, 3 sets 10x, 3 sets   Manual Therapy: (20) Done in order to improve joint and soft tissue mobility,reduce muscle guarding, and decrease muscle tone  · Knee extension mobilization to tibia and femur and with gentle traction, grade III-IV (NOT DONE TODAY)  · Patella mobilization, superior-inferior, medial-lateral, distraction using suction and lateral tilt, done at 0, 15 and 90 deg flex  · Quad tendon and vastus lateralis elongation mobilization   · Talocrural posterior-medial glide to facilitated DF, in supine   · Calcaneal distraction with DF of the ankle  · Midfoot mobilization while keeping cuboid stable   · Cuboid whip manipulation in prone, grade V  Modalities: ( 10 min) Done in order to reduce swelling and pain  Ice x 10 min  Treatment/Session Assessment: Mr. Saintclair Running tolerated the session well. He was able to tolerate step ups on a 2 inch step for the first time. He could do 15 before onset of pain. Overall patella mobility continues to improve. Mobilized the cuboid joint and felt an audible cavitation which should be a good thing for improving midfoot mechanics. Discussed HEP. Zafar Counter · Pain: Initial:   Minimal symptoms reported Post Session:  Mild knee soreness reported ·   Compliance with Program/Exercises: compliant   · Recommendations/Intent for next treatment session: \"Next visit will focus on progressing the patients plan of care\".     Future Appointments   Date Time Provider Alan Plunkett   11/26/2018 10:00 AM Diomedes Tsai PT, DPT [facility]   11/28/2018 10:00 AM Jarocho Jorge Arriaga DPT [facility]   12/3/2018 10:00 AM Trae Fine, PT, DPT [facility]   12/5/2018 10:00 AM Trae Fine, PT, DPT [facility]   12/10/2018 10:00 AM Trae Fine, PT, DPT [facility]   12/12/2018 10:00 AM Trae Fine PT, DPT [facility]   12/17/2018 10:00 AM Trae Fine, PT, DPT [facility]   12/19/2018  9:45 AM Trae Fine, PT, DPT [facility]     Total Treatment Duration:65 min  PT Patient Time In/Time Out  Time In: 0945  Time Out: 800 Enrike Mota PT, DPT

## 2018-11-26 ENCOUNTER — HOSPITAL ENCOUNTER (OUTPATIENT)
Dept: PHYSICAL THERAPY | Age: 53
Discharge: HOME | End: 2018-11-26
Payer: COMMERCIAL

## 2018-11-26 PROCEDURE — 97140 MANUAL THERAPY 1/> REGIONS: CPT

## 2018-11-26 PROCEDURE — 97110 THERAPEUTIC EXERCISES: CPT

## 2018-11-26 NOTE — PROGRESS NOTES
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 5 = High Risk)  MD Orders: evaluate and treat MEDICAL/REFERRING DIAGNOSIS:Right ankle sprain,chondromalacia right knee  DATE OF ONSET:   REFERRING PHYSICIAN:Loren Deluna PA  RETURN PHYSICIAN APPOINTMENT: did not specify       ASSESSMENT:  Mr. Clarice Silva has come for a total of 17  visits since starting physical therapy on 18. During that time he reports a 50-60% overall improvement and has full ROM of the knee and ankle. His knee pain seems to be most related to patella compression and the lateral foot pain is mostly due to cuboid instability and peroneal tendinopath. He continues to not be able to tolerate squatting activities. Using taping to distract the patella and stabilize the cuboid helps some of the pain but does not fully take it away. At this point bracing should be considered to mimic the taping and potentially other medical interventions since significant improvements have not been seen in the last couple weeks. Skilled physical therapy is recommended to continue progressing his plan of care. PROBLEM LIST (Impacting functional limitations):  1. Decreased Strength  2. Decreased ADL/Functional Activities  3. Increased Pain  4. Decreased Activity Tolerance  5. Decreased Flexibility/Joint Mobility  6. Decreased [place] with Home Exercise Program INTERVENTIONS PLANNED:  1. Cold  2. Heat  3. Home Exercise Program (HEP)  4. Manual Therapy  5. Range of Motion (ROM)  6.  Therapeutic Exercise/Strengthening     TREATMENT PLAN:  Effective Dates:  18 TO 19  Frequency/Duration: 2 times a week for 8 wks  GOALS: (Goals have been discussed and agreed upon with patient.)  SHORT-TERM FUNCTIONAL GOALS: Time Frame: 2-4 wks  1. Patient will report 25-50% reduction in overall symptoms (MET)  2. Patient will be compliant with HEP and plan of care (MET)  3. Patient will have 0-125 deg ROM of the knee (MET)   4. Patient will improve swelling by 1 cm on the figure 8 measurement  (MET)  5. Patient will improve on the LEFS by 5-7 points (MET)  DISCHARGE GOALS: Time Frame: 10-12 wk  1. Patient will report % reduction in overall symptoms in order to be able to have full function with decreased symptoms (ongoing)  2. Patient will be able to squat with minimal pain as needed at his job with minimal pain (0-2/10) (ongoing)   3. Patient will be able to ambulate with a symmetrical gait pattern as judged by therapist in order to able to perform activities of daily living (ongoing)  4. Patient will score 60 or greater on the LEFS points in order to demonstrate improved function with less pain    Rehabilitation Potential For Stated Goals: Good    Regarding Monica Perez therapy, I certify that the treatment plan above will be carried out by a therapist or under their direction. Thank you for this referral,  Yunior Darby PT,DPT              HISTORY:   History of Present Injury/Illness (Reason for Referral): Reports that he fell at work on 8-1-18. He had puncture wounds in his lower leg. He had a lot of bleeding during that time requiring stiches in one of the wounds. He had an MRI on his knee revealed a meniscus tear. He went to the medical facility at [address] [place] [address] and they sent him to the ED where he got stiches for his wounds. The next day he went to the [facility] doctor and was then referred to Dr. Sylvia Santos. He also had hand tingling afterwards. He was put on a blood thinner to prevent blood clots after having a negative ultrasound. He had a steroid shot in the knee with minimal benefit at this point.  No numbness in the foot reported. 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 2 stents, diabetes type II   Social History/Living Environment: lives in a house with family  Prior Level of Function/Work/Activity:independent / works at [employer]  Current Medications:   Aspirin, losartan, clopidogrel, omprezole, atovastatin, metoprolol, metformin, claratan   Date Last Reviewed:  11/26/2018   Number of Personal Factors/Comorbidities that affect the Plan of Care: 0: LOW COMPLEXITY   EXAMINATION:   (Abbreviations: P-pain, NP- no pain, wnl-within normal limits)    Observation/Orthostatic Postural Assessment:    Relaxed standing posture:  -     Palpation/tone/tissue texture:    ASIS: n/a  PSIS:n/a  Leg Length: supine:n/a         Long Sitting: n/a      Soft tissue:  · Hip flexors:mild tightness in R quad  · Hamstrings: increased tightness on R  · IT band: na  · gastroc/soleus/posterior tibialis: increased tightness on R (improving)  · Anterior tibialis: Increased tightness on R   (improved)    ROM:   Knee ROM Date:  11-19-18       Right Left   Flexion 135 140   Extension 0 deg  0      Ankle ROM Date:  11-19-18       Right Left   DF 10 10   PF wnl wnl   Inversion - -   Eversion - -           Strength:     Hip Strength Date:  11-19-18       Right Left   Flexion 5 5   Extension 5 5   IR n/a n/a   ER n/a n/a   Abduction 5 5      Knee Strength Date:  11-19-18       Right Left   Flexion 5 5   Extension 4 5             Special Tests:   Stability tests:  Lachmans: (-)  Slocums:  AM: (-)          AL: (-)  Posterior drawer: (-)  Posterior Sag: (-)    Varus: 0 deg: n/a today   30 deg: n/a today  Valgus: 0 deg: n/a today   30 deg: n/a today    Meniscus cluster test  1. End range flexion: -  2. End range extension: (-)  3. Joint line tenderness: medical joint line tenderness  (mild)  4. Report of locking: (-)  5.  McMurreys: n/a    Neurological Screen:   Myotomes: strong in bilateral LE's Dermatomes: intact in bilateral LE's         Reflexes: n/a         Neural Tension Tests: -  Functional Mobility:    · Double leg squat: able to do a function squat (does report increased pain with it  · Single leg squat:  L:functional        R: able to do a partial squat with increased pain  · Gait Pattern: ambulates with decreased terminal stance on R  (improved)  Balance:  Single leg   L: 10 sec, minimal sway  R: 10 sec, minimal sway        CLINICAL DECISION MAKING:   Outcome Measure: Tool Used: Lower Extremity Functional Scale (LEFS)  Score:  Initial: 27/80 43/80 (Date: 9-26-18 ) Most Recent: 36/80 (Date 11-19-18)   Interpretation of Score: 20 questions each scored on a 5 point scale with 0 representing \"extreme difficulty or unable to perform\" and 4 representing \"no difficulty\". The lower the score, the greater the functional disability. 80/80 represents no disability. Minimal detectable change is 9 points. Score 80 79-63 62-48 47-32 31-16 15-1 0   Modifier CH CI CJ CK CL CM CN     Medical Necessity:   · Patient is expected to demonstrate progress in strength, range of motion and symptom levels to return to full function. Reason for Services/Other Comments:  · Patient continues to require skilled intervention due to ongoing symptoms. TREATMENT:   (In addition to Assessment/Re-Assessment sessions the following treatments were rendered)    Subjective Pre-Treatment Symptoms: Reports that he is doing okay. Attempted to go down the stairs this wknd with a normal gait pattern and was only able to do one step w/ pain. Therapeutic Exercise: (30 min) Done in order to improve strength, ROM and understanding of current condition.     Date  11-21-18 Date  11-26-18   Activity/Exercise     Education Discussed HEP Discussed HEP   Biking 10 min 10 min   Taping · Taped cuboid into pronation, used 2 I strips of kinesio tape  · Taped patella into distraction using kinesio tape · Taped cuboid into pronation, used 2 I strips of kinesio tape  · Taped patella into distraction using kinesio tape   ankle eversion Manual resistance, 10 sec,5x Manual resistance, 10 sec,5x   Gait training  -   Terminal knee extension w/ step up 2\", 10x, 1 set, 5x-1 set  2\", 10x, 3 sets    Ankle proprioception Wobble board, worked on eversion stabilization, 5 min total -   Calf stretch 60 sec 60 sec   Quad stretch 60 sec 60 sec   SLR 10x, 3 sets HEP   Manual Therapy: (20) Done in order to improve joint and soft tissue mobility,reduce muscle guarding, and decrease muscle tone  · Knee extension mobilization to tibia and femur and with gentle traction, grade III-IV (NOT DONE TODAY)  · Patella mobilization, superior-inferior, medial-lateral and distraction using suction done at 0, 15 and 90 deg flex  · Quad tendon and vastus lateralis elongation mobilization   · Talocrural posterior-medial glide to facilitated DF, in supine   · Calcaneal distraction with DF of the ankle  · Midfoot mobilization while keeping cuboid stable   · Cuboid whip manipulation in prone, grade V  Modalities: ( 10 min) Done in order to reduce swelling and pain  Ice x 10 min  Treatment/Session Assessment: Mr. Sharri Walsh tolerated the session well. He was able to do 3 sets of step ups on 2\" step which is good progress. Mobility of the patella continues to improved. .       · Pain: Initial:   Minimal symptoms reported Post Session:  Mild knee soreness reported ·   Compliance with Program/Exercises: compliant   · Recommendations/Intent for next treatment session: \"Next visit will focus on progressing the patients plan of care\".     Future Appointments   Date Time Provider Alan Plunkett   11/28/2018 10:00 AM Gala Rivera PT, DPT [facility]   12/3/2018 10:00 AM Gala Rivera PT, DPT [facility]   12/5/2018 10:00 AM Gala Rivera PT, DPT [facility]   12/10/2018 10:00 AM Gala Rivera PT, DPT [facility]   12/12/2018 10:00 AM Jarocho RUPERTO Kelley [facility]   12/17/2018 10:00 AM Phill Jolley PT, DPT [facility]   12/19/2018  9:45 AM Phill Jolley PT, DPT [facility]     Total Treatment Duration:60 min  PT Patient Time In/Time Out  Time In: 1000  Time Out: 800 [place] Svetlana ENGLISH, RUPERTO

## 2018-11-28 ENCOUNTER — HOSPITAL ENCOUNTER (OUTPATIENT)
Dept: PHYSICAL THERAPY | Age: 53
Discharge: HOME | End: 2018-11-28
Payer: COMMERCIAL

## 2018-11-28 PROCEDURE — 97110 THERAPEUTIC EXERCISES: CPT

## 2018-11-28 PROCEDURE — 97140 MANUAL THERAPY 1/> REGIONS: CPT

## 2018-11-28 NOTE — PROGRESS NOTES
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 5 = High Risk)  MD Orders: evaluate and treat MEDICAL/REFERRING DIAGNOSIS:Right ankle sprain,chondromalacia right knee  DATE OF ONSET:   REFERRING PHYSICIAN:Cipriano Deluna PA  RETURN PHYSICIAN APPOINTMENT: did not specify       ASSESSMENT:  Mr. Maulik Manrique has come for a total of 17  visits since starting physical therapy on 18. During that time he reports a 50-60% overall improvement and has full ROM of the knee and ankle. His knee pain seems to be most related to patella compression and the lateral foot pain is mostly due to cuboid instability and peroneal tendinopath. He continues to not be able to tolerate squatting activities. Using taping to distract the patella and stabilize the cuboid helps some of the pain but does not fully take it away. At this point bracing should be considered to mimic the taping and potentially other medical interventions since significant improvements have not been seen in the last couple weeks. Skilled physical therapy is recommended to continue progressing his plan of care. PROBLEM LIST (Impacting functional limitations):  1. Decreased Strength  2. Decreased ADL/Functional Activities  3. Increased Pain  4. Decreased Activity Tolerance  5. Decreased Flexibility/Joint Mobility  6. Decreased [place] with Home Exercise Program INTERVENTIONS PLANNED:  1. Cold  2. Heat  3. Home Exercise Program (HEP)  4. Manual Therapy  5. Range of Motion (ROM)  6.  Therapeutic Exercise/Strengthening     TREATMENT PLAN:  Effective Dates:  18 TO 19  Frequency/Duration: 2 times a week for 8 wks  GOALS: (Goals have been discussed and agreed upon with patient.)  SHORT-TERM FUNCTIONAL GOALS: Time Frame: 2-4 wks  1. Patient will report 25-50% reduction in overall symptoms (MET)  2. Patient will be compliant with HEP and plan of care (MET)  3. Patient will have 0-125 deg ROM of the knee (MET)   4. Patient will improve swelling by 1 cm on the figure 8 measurement  (MET)  5. Patient will improve on the LEFS by 5-7 points (MET)  DISCHARGE GOALS: Time Frame: 10-12 wk  1. Patient will report % reduction in overall symptoms in order to be able to have full function with decreased symptoms (ongoing)  2. Patient will be able to squat with minimal pain as needed at his job with minimal pain (0-2/10) (ongoing)   3. Patient will be able to ambulate with a symmetrical gait pattern as judged by therapist in order to able to perform activities of daily living (ongoing)  4. Patient will score 60 or greater on the LEFS points in order to demonstrate improved function with less pain    Rehabilitation Potential For Stated Goals: Good    Regarding Asad Chadwick therapy, I certify that the treatment plan above will be carried out by a therapist or under their direction. Thank you for this referral,  Lorraine Hernandez PT,DPT              HISTORY:   History of Present Injury/Illness (Reason for Referral): Reports that he fell at work on 8-1-18. He had puncture wounds in his lower leg. He had a lot of bleeding during that time requiring stiches in one of the wounds. He had an MRI on his knee revealed a meniscus tear. He went to the medical facility at [address] [place] [address] and they sent him to the ED where he got stiches for his wounds. The next day he went to the [facility] doctor and was then referred to Dr. Pretty Guan. He also had hand tingling afterwards. He was put on a blood thinner to prevent blood clots after having a negative ultrasound. He had a steroid shot in the knee with minimal benefit at this point.  No numbness in the foot reported. 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 2 stents, diabetes type II   Social History/Living Environment: lives in a house with family  Prior Level of Function/Work/Activity:independent / works at [employer]  Current Medications:   Aspirin, losartan, clopidogrel, omprezole, atovastatin, metoprolol, metformin, claratan   Date Last Reviewed:  11/28/2018   Number of Personal Factors/Comorbidities that affect the Plan of Care: 0: LOW COMPLEXITY   EXAMINATION:   (Abbreviations: P-pain, NP- no pain, wnl-within normal limits)    Observation/Orthostatic Postural Assessment:    Relaxed standing posture:  -     Palpation/tone/tissue texture:    ASIS: n/a  PSIS:n/a  Leg Length: supine:n/a         Long Sitting: n/a      Soft tissue:  · Hip flexors:mild tightness in R quad  · Hamstrings: increased tightness on R  · IT band: na  · gastroc/soleus/posterior tibialis: increased tightness on R (improving)  · Anterior tibialis: Increased tightness on R   (improved)    ROM:   Knee ROM Date:  11-19-18       Right Left   Flexion 135 140   Extension 0 deg  0      Ankle ROM Date:  11-19-18       Right Left   DF 10 10   PF wnl wnl   Inversion - -   Eversion - -           Strength:     Hip Strength Date:  11-19-18       Right Left   Flexion 5 5   Extension 5 5   IR n/a n/a   ER n/a n/a   Abduction 5 5      Knee Strength Date:  11-19-18       Right Left   Flexion 5 5   Extension 4 5             Special Tests:   Stability tests:  Lachmans: (-)  Slocums:  AM: (-)          AL: (-)  Posterior drawer: (-)  Posterior Sag: (-)    Varus: 0 deg: n/a today   30 deg: n/a today  Valgus: 0 deg: n/a today   30 deg: n/a today    Meniscus cluster test  1. End range flexion: -  2. End range extension: (-)  3. Joint line tenderness: medical joint line tenderness  (mild)  4. Report of locking: (-)  5.  McMurreys: n/a    Neurological Screen:   Myotomes: strong in bilateral LE's Dermatomes: intact in bilateral LE's         Reflexes: n/a         Neural Tension Tests: -  Functional Mobility:    · Double leg squat: able to do a function squat (does report increased pain with it  · Single leg squat:  L:functional        R: able to do a partial squat with increased pain  · Gait Pattern: ambulates with decreased terminal stance on R  (improved)  Balance:  Single leg   L: 10 sec, minimal sway  R: 10 sec, minimal sway        CLINICAL DECISION MAKING:   Outcome Measure: Tool Used: Lower Extremity Functional Scale (LEFS)  Score:  Initial: 27/80 43/80 (Date: 9-26-18 ) Most Recent: 36/80 (Date 11-19-18)   Interpretation of Score: 20 questions each scored on a 5 point scale with 0 representing \"extreme difficulty or unable to perform\" and 4 representing \"no difficulty\". The lower the score, the greater the functional disability. 80/80 represents no disability. Minimal detectable change is 9 points. Score 80 79-63 62-48 47-32 31-16 15-1 0   Modifier CH CI CJ CK CL CM CN     Medical Necessity:   · Patient is expected to demonstrate progress in strength, range of motion and symptom levels to return to full function. Reason for Services/Other Comments:  · Patient continues to require skilled intervention due to ongoing symptoms. TREATMENT:   (In addition to Assessment/Re-Assessment sessions the following treatments were rendered)    Subjective Pre-Treatment Symptoms: Reports that he is doing pretty good. No soreness from the last session. .    Therapeutic Exercise: (30 min) Done in order to improve strength, ROM and understanding of current condition.     Date  11-26-18 Date  11-28-18   Activity/Exercise     Education Discussed HEP Discussed HEP   Biking 10 min 10 min   Taping · Taped cuboid into pronation, used 2 I strips of kinesio tape  · Taped patella into distraction using kinesio tape · Taped cuboid into pronation, used 2 I strips of kinesio tape  · Taped patella into distraction using kinesio tape   ankle eversion Manual resistance, 10 sec,5x Manual resistance, 10 sec, 5x   Gait training - -   Terminal knee extension w/ step up 2\", 10x, 3 sets  2\", 10x, 3 sets  4\", 10x   Ankle proprioception - · Wobble board, eyes closed, in sitting, 5 min total  · SL balance- HEP   Calf stretch 60 sec 60 sec   Quad stretch 60 sec 60 sec   SLR HEP HEP   Heel raises  10x, 3 sets   Manual Therapy: (20) Done in order to improve joint and soft tissue mobility,reduce muscle guarding, and decrease muscle tone  · Patella mobilization, superior-inferior, medial-lateral and distraction using suction done at 0, 15 and 90 deg flex  · Talocrural posterior-medial glide to facilitated DF, in supine   · Calcaneal distraction with DF of the ankle  · Midfoot mobilization while keeping cuboid stable   · Cuboid whip manipulation in prone, grade V  Modalities: ( 10 min) Done in order to reduce swelling and pain  Ice x 15 min  Treatment/Session Assessment: Mr. Maddie Alexander tolerated the session well. We were able to start with a 4\" step with minor pain today. Discussed his home program. Slow gradual progress is still being seen. · Pain: Initial:   Minimal symptoms reported Post Session:  Mild knee soreness reported ·   Compliance with Program/Exercises: compliant   · Recommendations/Intent for next treatment session: \"Next visit will focus on progressing the patients plan of care\".     Future Appointments   Date Time Provider Alan Plunkett   12/5/2018  3:30 PM Lars Fleming PT, DPT [facility]   12/7/2018  2:30 PM Lars Fleming PT, DPT [facility]   12/10/2018 10:00 AM Lars Fleming PT, DPT [facility]   12/12/2018 10:00 AM Lars Fleming PT, DPT [facility]   12/17/2018 10:00 AM Lars Fleming PT, DPT [facility]   12/19/2018  9:45 AM Lars Fleming PT, DPT [facility]     Total Treatment Duration:65 min  PT Patient Time In/Time Out  Time In: 1005  Time Out: Michael Hurst DPT

## 2018-12-03 ENCOUNTER — APPOINTMENT (OUTPATIENT)
Dept: PHYSICAL THERAPY | Age: 53
End: 2018-12-03
Payer: COMMERCIAL

## 2018-12-05 ENCOUNTER — HOSPITAL ENCOUNTER (OUTPATIENT)
Dept: PHYSICAL THERAPY | Age: 53
Discharge: HOME | End: 2018-12-05
Payer: COMMERCIAL

## 2018-12-05 PROCEDURE — 97140 MANUAL THERAPY 1/> REGIONS: CPT

## 2018-12-05 PROCEDURE — 97110 THERAPEUTIC EXERCISES: CPT

## 2018-12-05 NOTE — PROGRESS NOTES
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 5 = High Risk)  MD Orders: evaluate and treat MEDICAL/REFERRING DIAGNOSIS:Right ankle sprain,chondromalacia right knee  DATE OF ONSET:   REFERRING PHYSICIAN:Marlo Deluna PA  RETURN PHYSICIAN APPOINTMENT: did not specify       ASSESSMENT:  Mr. Jonny Alfaro has come for a total of 17  visits since starting physical therapy on 18. During that time he reports a 50-60% overall improvement and has full ROM of the knee and ankle. His knee pain seems to be most related to patella compression and the lateral foot pain is mostly due to cuboid instability and peroneal tendinopath. He continues to not be able to tolerate squatting activities. Using taping to distract the patella and stabilize the cuboid helps some of the pain but does not fully take it away. At this point bracing should be considered to mimic the taping and potentially other medical interventions since significant improvements have not been seen in the last couple weeks. Skilled physical therapy is recommended to continue progressing his plan of care. PROBLEM LIST (Impacting functional limitations):  1. Decreased Strength  2. Decreased ADL/Functional Activities  3. Increased Pain  4. Decreased Activity Tolerance  5. Decreased Flexibility/Joint Mobility  6. Decreased [place] with Home Exercise Program INTERVENTIONS PLANNED:  1. Cold  2. Heat  3. Home Exercise Program (HEP)  4. Manual Therapy  5. Range of Motion (ROM)  6.  Therapeutic Exercise/Strengthening     TREATMENT PLAN:  Effective Dates:  18 TO 19  Frequency/Duration: 2 times a week for 8 wks  GOALS: (Goals have been discussed and agreed upon with patient.)  SHORT-TERM FUNCTIONAL GOALS: Time Frame: 2-4 wks  1. Patient will report 25-50% reduction in overall symptoms (MET)  2. Patient will be compliant with HEP and plan of care (MET)  3. Patient will have 0-125 deg ROM of the knee (MET)   4. Patient will improve swelling by 1 cm on the figure 8 measurement  (MET)  5. Patient will improve on the LEFS by 5-7 points (MET)  DISCHARGE GOALS: Time Frame: 10-12 wk  1. Patient will report % reduction in overall symptoms in order to be able to have full function with decreased symptoms (ongoing)  2. Patient will be able to squat with minimal pain as needed at his job with minimal pain (0-2/10) (ongoing)   3. Patient will be able to ambulate with a symmetrical gait pattern as judged by therapist in order to able to perform activities of daily living (ongoing)  4. Patient will score 60 or greater on the LEFS points in order to demonstrate improved function with less pain    Rehabilitation Potential For Stated Goals: Good    Regarding Christyjamila RousseauRedd therapy, I certify that the treatment plan above will be carried out by a therapist or under their direction. Thank you for this referral,  Iona Weller PT,DPT              HISTORY:   History of Present Injury/Illness (Reason for Referral): Reports that he fell at work on 8-1-18. He had puncture wounds in his lower leg. He had a lot of bleeding during that time requiring stiches in one of the wounds. He had an MRI on his knee revealed a meniscus tear. He went to the medical facility at [address] [place] [address] and they sent him to the ED where he got stiches for his wounds. The next day he went to the [facility] doctor and was then referred to Dr. Gracy Jesus. He also had hand tingling afterwards. He was put on a blood thinner to prevent blood clots after having a negative ultrasound. He had a steroid shot in the knee with minimal benefit at this point.  No numbness in the foot reported. 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 2 stents, diabetes type II   Social History/Living Environment: lives in a house with family  Prior Level of Function/Work/Activity:independent / works at [employer]  Current Medications:   Aspirin, losartan, clopidogrel, omprezole, atovastatin, metoprolol, metformin, claratan   Date Last Reviewed:  12/5/2018   Number of Personal Factors/Comorbidities that affect the Plan of Care: 0: LOW COMPLEXITY   EXAMINATION:   (Abbreviations: P-pain, NP- no pain, wnl-within normal limits)    Observation/Orthostatic Postural Assessment:    Relaxed standing posture:  -     Palpation/tone/tissue texture:    ASIS: n/a  PSIS:n/a  Leg Length: supine:n/a         Long Sitting: n/a      Soft tissue:  · Hip flexors:mild tightness in R quad  · Hamstrings: increased tightness on R  · IT band: na  · gastroc/soleus/posterior tibialis: increased tightness on R (improving)  · Anterior tibialis: Increased tightness on R   (improved)    ROM:   Knee ROM Date:  11-19-18       Right Left   Flexion 135 140   Extension 0 deg  0      Ankle ROM Date:  11-19-18       Right Left   DF 10 10   PF wnl wnl   Inversion - -   Eversion - -           Strength:     Hip Strength Date:  11-19-18       Right Left   Flexion 5 5   Extension 5 5   IR n/a n/a   ER n/a n/a   Abduction 5 5      Knee Strength Date:  11-19-18       Right Left   Flexion 5 5   Extension 4 5             Special Tests:   Stability tests:  Lachmans: (-)  Slocums:  AM: (-)          AL: (-)  Posterior drawer: (-)  Posterior Sag: (-)    Varus: 0 deg: n/a today   30 deg: n/a today  Valgus: 0 deg: n/a today   30 deg: n/a today    Meniscus cluster test  1. End range flexion: -  2. End range extension: (-)  3. Joint line tenderness: medical joint line tenderness  (mild)  4. Report of locking: (-)  5.  McMurreys: n/a    Neurological Screen:   Myotomes: strong in bilateral LE's Dermatomes: intact in bilateral LE's         Reflexes: n/a         Neural Tension Tests: -  Functional Mobility:    · Double leg squat: able to do a function squat (does report increased pain with it  · Single leg squat:  L:functional        R: able to do a partial squat with increased pain  · Gait Pattern: ambulates with decreased terminal stance on R  (improved)  Balance:  Single leg   L: 10 sec, minimal sway  R: 10 sec, minimal sway        CLINICAL DECISION MAKING:   Outcome Measure: Tool Used: Lower Extremity Functional Scale (LEFS)  Score:  Initial: 27/80 43/80 (Date: 9-26-18 ) Most Recent: 36/80 (Date 11-19-18)   Interpretation of Score: 20 questions each scored on a 5 point scale with 0 representing \"extreme difficulty or unable to perform\" and 4 representing \"no difficulty\". The lower the score, the greater the functional disability. 80/80 represents no disability. Minimal detectable change is 9 points. Score 80 79-63 62-48 47-32 31-16 15-1 0   Modifier CH CI CJ CK CL CM CN     Medical Necessity:   · Patient is expected to demonstrate progress in strength, range of motion and symptom levels to return to full function. Reason for Services/Other Comments:  · Patient continues to require skilled intervention due to ongoing symptoms. TREATMENT:   (In addition to Assessment/Re-Assessment sessions the following treatments were rendered)    Subjective Pre-Treatment Symptoms: Reports that he is doing pretty good. States that taping continues to help. Notices he can ascend about 4 step before onset of pain. Therapeutic Exercise: (30 min) Done in order to improve strength, ROM and understanding of current condition.     Date  11-26-18 Date  11-28-18 Date  12-5-18   Activity/Exercise      Education Discussed HEP Discussed HEP Discussed HEP   Biking 10 min 10 min 10 min   Taping · Taped cuboid into pronation, used 2 I strips of kinesio tape  · Taped patella into distraction using kinesio tape · Taped cuboid into pronation, used 2 I strips of kinesio tape  · Taped patella into distraction using kinesio tape · Taped cuboid into pronation, used 2 I strips of kinesio tape  · Taped patella into distraction using kinesio tape   ankle eversion Manual resistance, 10 sec,5x Manual resistance, 10 sec, 5x Manual resistance, 10x, 2 sets, eccentric control focus   Gait training - -    Terminal knee extension w/ step up 2\", 10x, 3 sets  2\", 10x, 3 sets  4\", 10x 4\", 10x, 3 sets   Ankle proprioception - · Wobble board, eyes closed, in sitting, 5 min total  · SL balance- HEP -   Calf stretch 60 sec 60 sec 60 sec   Quad stretch 60 sec 60 sec 60 sec, 2x   SLR HEP HEP    Heel raises  10x, 3 sets SL, 10x, 3 sets   Manual Therapy: (30) Done in order to improve joint and soft tissue mobility,reduce muscle guarding, and decrease muscle tone  · Patella mobilization, superior-inferior, medial-lateral and distraction using suction done at 0, 15 and 90 deg flex  · Talocrural posterior-medial glide to facilitated DF, in supine   · Calcaneal distraction with DF of the ankle  · Midfoot mobilization while keeping cuboid stable   · Cuboid whip manipulation in prone, grade V  · Mobilize medial  Gastroc, done with active DF/PF  Modalities: ( 15 min) Done in order to reduce swelling and pain  Ice x 15 min  Treatment/Session Assessment: Mr. Sharri Walsh tolerated the session well. He was able to do 17 step ups before 1st onset of pain from a 4\" step which is good progress. Cuboid and talocrural mobility are doing well but he did have moderate tightness in his gastroc. Discussed his updated home program.   · Pain: Initial:   Minimal symptoms reported Post Session:  No pain at rest ·   Compliance with Program/Exercises: compliant   · Recommendations/Intent for next treatment session: \"Next visit will focus on progressing the patients plan of care\".     Future Appointments   Date Time Provider Alan Plunkett   12/7/2018  2:30 PM Gala Rivera, PT, DPT [facility]   12/10/2018 10:00 AM [facility]   12/12/2018 10:00 AM [facility]   12/17/2018 10:00 AM [facility]   12/19/2018  9:45 AM Natalie Allan PT, DPT [facility]     Total Treatment Duration:75 min  PT Patient Time In/Time Out  Time In: 1843  Time Out: [facility] PT, DPT

## 2018-12-06 NOTE — PROGRESS NOTES
Hue Dey  : 1965  Primary: Thersia Clifton [employer]  Secondary: Thersia Clifton [employer] [address] [facility] at [address]  [facility] [address] [place] [address]  [phone]   [phone]        Mr. Giselle Whitley has come for a total of 21 visits since starting physical therapy on 18. During that time his knee ankle ROM have significantly progressed and are within normal limits. He still has limitations of patella mobility which we are currenlty working on and using tape to provide slight gapping/stability to it. He is gradually progressing with squatting but that is still the most limitating factor. Currently he can do 17 step up's on a 4\" step before onset of pain. His lateral ankle pain seems to be predominenatly related to cuboid instability and slight peroneal tendinopathy. I manage this with mobilizations, taping and continuing to work on pain free balance and strength. This should not stop him from returning to work. My personal recommendation would be to get a patella stability brace so that he does not have to rely on taping and gradual progression of weightbearing strength.      Thank  You    Francisco Javier Schultz PT,DPT,OCS

## 2018-12-10 ENCOUNTER — HOSPITAL ENCOUNTER (OUTPATIENT)
Dept: PHYSICAL THERAPY | Age: 53
End: 2018-12-10
Payer: COMMERCIAL

## 2018-12-12 ENCOUNTER — HOSPITAL ENCOUNTER (OUTPATIENT)
Dept: PHYSICAL THERAPY | Age: 53
Discharge: HOME | End: 2018-12-12
Payer: COMMERCIAL

## 2018-12-12 PROCEDURE — 97110 THERAPEUTIC EXERCISES: CPT

## 2018-12-12 PROCEDURE — 97140 MANUAL THERAPY 1/> REGIONS: CPT

## 2018-12-17 ENCOUNTER — HOSPITAL ENCOUNTER (OUTPATIENT)
Dept: PHYSICAL THERAPY | Age: 53
Discharge: HOME | End: 2018-12-17
Payer: COMMERCIAL

## 2018-12-17 PROCEDURE — 97110 THERAPEUTIC EXERCISES: CPT

## 2018-12-17 PROCEDURE — 97140 MANUAL THERAPY 1/> REGIONS: CPT

## 2018-12-17 NOTE — PROGRESS NOTES
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 5 = High Risk)  MD Orders: evaluate and treat MEDICAL/REFERRING DIAGNOSIS:Right ankle sprain,chondromalacia right knee  DATE OF ONSET:   REFERRING PHYSICIAN:Natividad Deluna PA  RETURN PHYSICIAN APPOINTMENT: did not specify       ASSESSMENT:  Mr. Uriel Alves has come for a total of 17  visits since starting physical therapy on 18. During that time he reports a 50-60% overall improvement and has full ROM of the knee and ankle. His knee pain seems to be most related to patella compression and the lateral foot pain is mostly due to cuboid instability and peroneal tendinopath. He continues to not be able to tolerate squatting activities. Using taping to distract the patella and stabilize the cuboid helps some of the pain but does not fully take it away. At this point bracing should be considered to mimic the taping and potentially other medical interventions since significant improvements have not been seen in the last couple weeks. Skilled physical therapy is recommended to continue progressing his plan of care. PROBLEM LIST (Impacting functional limitations):  1. Decreased Strength  2. Decreased ADL/Functional Activities  3. Increased Pain  4. Decreased Activity Tolerance  5. Decreased Flexibility/Joint Mobility  6. Decreased [place] with Home Exercise Program INTERVENTIONS PLANNED:  1. Cold  2. Heat  3. Home Exercise Program (HEP)  4. Manual Therapy  5. Range of Motion (ROM)  6.  Therapeutic Exercise/Strengthening     TREATMENT PLAN:  Effective Dates:  18 TO 19  Frequency/Duration: 2 times a week for 8 wks  GOALS: (Goals have been discussed and agreed upon with patient.)  SHORT-TERM FUNCTIONAL GOALS: Time Frame: 2-4 wks  1. Patient will report 25-50% reduction in overall symptoms (MET)  2. Patient will be compliant with HEP and plan of care (MET)  3. Patient will have 0-125 deg ROM of the knee (MET)   4. Patient will improve swelling by 1 cm on the figure 8 measurement  (MET)  5. Patient will improve on the LEFS by 5-7 points (MET)  DISCHARGE GOALS: Time Frame: 10-12 wk  1. Patient will report % reduction in overall symptoms in order to be able to have full function with decreased symptoms (ongoing)  2. Patient will be able to squat with minimal pain as needed at his job with minimal pain (0-2/10) (ongoing)   3. Patient will be able to ambulate with a symmetrical gait pattern as judged by therapist in order to able to perform activities of daily living (ongoing)  4. Patient will score 60 or greater on the LEFS points in order to demonstrate improved function with less pain    Rehabilitation Potential For Stated Goals: Good    Regarding Arturo Spears therapy, I certify that the treatment plan above will be carried out by a therapist or under their direction. Thank you for this referral,  Sukumar Deng PT,DPT              HISTORY:   History of Present Injury/Illness (Reason for Referral): Reports that he fell at work on 8-1-18. He had puncture wounds in his lower leg. He had a lot of bleeding during that time requiring stiches in one of the wounds. He had an MRI on his knee revealed a meniscus tear. He went to the medical facility at [address] [place] [address] and they sent him to the ED where he got stiches for his wounds. The next day he went to the [facility] and was then referred to Dr. Ira Pardo. He also had hand tingling afterwards. He was put on a blood thinner to prevent blood clots after having a negative ultrasound. He had a steroid shot in the knee with minimal benefit at this point.  No numbness in the foot reported. 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 2 stents, diabetes type II   Social History/Living Environment: lives in a house with family  Prior Level of Function/Work/Activity:independent / works at [employer]  Current Medications:   Aspirin, losartan, clopidogrel, omprezole, atovastatin, metoprolol, metformin, claratan   Date Last Reviewed:  12/17/2018   Number of Personal Factors/Comorbidities that affect the Plan of Care: 0: LOW COMPLEXITY   EXAMINATION:   (Abbreviations: P-pain, NP- no pain, wnl-within normal limits)    Observation/Orthostatic Postural Assessment:    Relaxed standing posture:  -     Palpation/tone/tissue texture:    ASIS: n/a  PSIS:n/a  Leg Length: supine:n/a         Long Sitting: n/a      Soft tissue:  · Hip flexors:mild tightness in R quad  · Hamstrings: increased tightness on R  · IT band: na  · gastroc/soleus/posterior tibialis: increased tightness on R (improving)  · Anterior tibialis: Increased tightness on R   (improved)    ROM:   Knee ROM Date:  11-19-18       Right Left   Flexion 135 140   Extension 0 deg  0      Ankle ROM Date:  11-19-18       Right Left   DF 10 10   PF wnl wnl   Inversion - -   Eversion - -           Strength:     Hip Strength Date:  11-19-18       Right Left   Flexion 5 5   Extension 5 5   IR n/a n/a   ER n/a n/a   Abduction 5 5      Knee Strength Date:  11-19-18       Right Left   Flexion 5 5   Extension 4 5             Special Tests:   Stability tests:  Lachmans: (-)  Slocums:  AM: (-)          AL: (-)  Posterior drawer: (-)  Posterior Sag: (-)    Varus: 0 deg: n/a today   30 deg: n/a today  Valgus: 0 deg: n/a today   30 deg: n/a today    Meniscus cluster test  1. End range flexion: -  2. End range extension: (-)  3. Joint line tenderness: medical joint line tenderness  (mild)  4. Report of locking: (-)  5.  McMurreys: n/a    Neurological Screen:   Myotomes: strong in bilateral LE's Dermatomes: intact in bilateral LE's         Reflexes: n/a         Neural Tension Tests: -  Functional Mobility:    · Double leg squat: able to do a function squat (does report increased pain with it  · Single leg squat:  L:functional        R: able to do a partial squat with increased pain  · Gait Pattern: ambulates with decreased terminal stance on R  (improved)  Balance:  Single leg   L: 10 sec, minimal sway  R: 10 sec, minimal sway        CLINICAL DECISION MAKING:   Outcome Measure: Tool Used: Lower Extremity Functional Scale (LEFS)  Score:  Initial: 27/80 43/80 (Date: 9-26-18 ) Most Recent: 36/80 (Date 11-19-18)   Interpretation of Score: 20 questions each scored on a 5 point scale with 0 representing \"extreme difficulty or unable to perform\" and 4 representing \"no difficulty\". The lower the score, the greater the functional disability. 80/80 represents no disability. Minimal detectable change is 9 points. Score 80 79-63 62-48 47-32 31-16 15-1 0   Modifier CH CI CJ CK CL CM CN     Medical Necessity:   · Patient is expected to demonstrate progress in strength, range of motion and symptom levels to return to full function. Reason for Services/Other Comments:  · Patient continues to require skilled intervention due to ongoing symptoms. TREATMENT:   (In addition to Assessment/Re-Assessment sessions the following treatments were rendered)    Subjective Pre-Treatment Symptoms: Reports that he is doing pretty good. States he is able to ascend and descend 4-5 steps before onset of knee symptoms. Otherwise doing pretty well. Therapeutic Exercise: (30 min) Done in order to improve strength, ROM and understanding of current condition.     Date  12-5-18 Date  12-12-18 Date  12-17-18   Activity/Exercise      Education Discussed HEP Discussed progressing with HEP Discussed HEP   Biking 10 min 8 min for warm up 10 min   Taping · Taped cuboid into pronation, used 2 I strips of kinesio tape  · Taped patella into distraction using kinesio tape · Taped cuboid into pronation, used 2 I strips of kinesio tape  · Taped patella into distraction using kinesio tape · Taped cuboid into pronation, used 2 I strips of kinesio tape  · Taped patella into distraction using kinesio tape   ankle eversion Manual resistance, 10x, 2 sets, eccentric control focus Manual resistance, 10x, 2 sets, eccentric focus HEP   Gait training      Terminal knee extension w/ step up 4\", 10x, 3 sets 6\", 8x  4\" 10x, 2 sets 4\", 10x, 3 sets   Ankle proprioception - SL balance with anterior-posterior weight shift, 20 sec, 5x SL balance, anterior-posterior weight shift, 30 sec, 5x   Calf stretch 60 sec 60 sec 60 sec   Quad stretch 60 sec, 2x 60 sec 60 sec   Heel raises SL, 10x, 3 sets SL, 10x, 3 sets SL, 10x, 3 sets   Manual Therapy: (20) Done in order to improve joint and soft tissue mobility,reduce muscle guarding, and decrease muscle tone  · Patella mobilization, superior-inferior, medial-lateral and distraction using suction done at 0, 15 and 90 deg flex  · Talocrural posterior-medial glide to facilitated DF, in supine   · Calcaneal distraction with DF of the ankle  · Midfoot mobilization while keeping cuboid stable   · Cuboid whip manipulation in prone, grade V  · Mobilize medial  Gastroc, done with active DF/PF  Modalities: ( 10 min) Done in order to reduce swelling and pain  Ice x 10min  Treatment/Session Assessment: Mr. Luigi Medina tolerated the session well. Continuing to progress with endurance/tolerance w/ step ups. Patella-femoral joint does seem more mobile even in higher knee flexion angles. · Pain: Initial:   No symptoms at rest Post Session:  No pain at rest ·   Compliance with Program/Exercises: compliant   · Recommendations/Intent for next treatment session: \"Next visit will focus on progressing the patients plan of care\".     Future Appointments   Date Time Provider Alan Plunkett   12/19/2018  9:45 AM Orly Wilburn, PT, DPT [facility] 12/31/2018  1:00 PM Phill Jolley PT, DPT [facility]   1/2/2019 10:00 AM Phill Jolley PT, DPT [facility]   1/7/2019 10:00 AM Phill Jolley PT, DPT [facility]   1/9/2019 10:00 AM Phill Jolley PT, DPT [facility]   1/14/2019 10:00 AM Phill Jolley PT, DPT [facility]   1/16/2019 10:45 AM Phill Jolley PT, DPT [facility]   1/23/2019 10:00 AM Phill Jolley PT, DPT [facility]   1/25/2019 10:00 AM Phill Jolley PT, DPT [facility]   1/28/2019 10:00 AM Phill Jolley PT, DPT [facility]   1/30/2019 10:45 AM Phill Jolley PT, DPT [facility]   2/4/2019 10:00 AM Phill Jolley PT, DPT [facility]   2/6/2019 10:00 AM Phill Jolley PT, DPT [facility]     Total Treatment Duration:60 min  PT Patient Time In/Time Out  Time In: 1000  Time Out: 800 Enrike Mota PT, DPT

## 2018-12-19 ENCOUNTER — HOSPITAL ENCOUNTER (OUTPATIENT)
Dept: PHYSICAL THERAPY | Age: 53
Discharge: HOME | End: 2018-12-19
Payer: COMMERCIAL

## 2018-12-19 PROCEDURE — 97140 MANUAL THERAPY 1/> REGIONS: CPT

## 2018-12-19 PROCEDURE — 97110 THERAPEUTIC EXERCISES: CPT

## 2018-12-19 NOTE — PROGRESS NOTES
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 5 = High Risk)  MD Orders: evaluate and treat MEDICAL/REFERRING DIAGNOSIS:Right ankle sprain,chondromalacia right knee  DATE OF ONSET:   REFERRING PHYSICIAN:Jian Deluna PA  RETURN PHYSICIAN APPOINTMENT: did not specify       ASSESSMENT:  Mr. Demetrio Dela Cruz has come for a total of 17  visits since starting physical therapy on 18. During that time he reports a 50-60% overall improvement and has full ROM of the knee and ankle. His knee pain seems to be most related to patella compression and the lateral foot pain is mostly due to cuboid instability and peroneal tendinopath. He continues to not be able to tolerate squatting activities. Using taping to distract the patella and stabilize the cuboid helps some of the pain but does not fully take it away. At this point bracing should be considered to mimic the taping and potentially other medical interventions since significant improvements have not been seen in the last couple weeks. Skilled physical therapy is recommended to continue progressing his plan of care. PROBLEM LIST (Impacting functional limitations):  1. Decreased Strength  2. Decreased ADL/Functional Activities  3. Increased Pain  4. Decreased Activity Tolerance  5. Decreased Flexibility/Joint Mobility  6. Decreased [place] with Home Exercise Program INTERVENTIONS PLANNED:  1. Cold  2. Heat  3. Home Exercise Program (HEP)  4. Manual Therapy  5. Range of Motion (ROM)  6.  Therapeutic Exercise/Strengthening     TREATMENT PLAN:  Effective Dates:  18 TO 19  Frequency/Duration: 2 times a week for 8 wks  GOALS: (Goals have been discussed and agreed upon with patient.)  SHORT-TERM FUNCTIONAL GOALS: Time Frame: 2-4 wks  1. Patient will report 25-50% reduction in overall symptoms (MET)  2. Patient will be compliant with HEP and plan of care (MET)  3. Patient will have 0-125 deg ROM of the knee (MET)   4. Patient will improve swelling by 1 cm on the figure 8 measurement  (MET)  5. Patient will improve on the LEFS by 5-7 points (MET)  DISCHARGE GOALS: Time Frame: 10-12 wk  1. Patient will report % reduction in overall symptoms in order to be able to have full function with decreased symptoms (ongoing)  2. Patient will be able to squat with minimal pain as needed at his job with minimal pain (0-2/10) (ongoing)   3. Patient will be able to ambulate with a symmetrical gait pattern as judged by therapist in order to able to perform activities of daily living (ongoing)  4. Patient will score 60 or greater on the LEFS points in order to demonstrate improved function with less pain    Rehabilitation Potential For Stated Goals: Good    Regarding Kirby Yousif therapy, I certify that the treatment plan above will be carried out by a therapist or under their direction. Thank you for this referral,  Durene Councilman PT,DPT              HISTORY:   History of Present Injury/Illness (Reason for Referral): Reports that he fell at work on 8-1-18. He had puncture wounds in his lower leg. He had a lot of bleeding during that time requiring stiches in one of the wounds. He had an MRI on his knee revealed a meniscus tear. He went to the medical facility at [address] [place] [address] and they sent him to the ED where he got stiches for his wounds. The next day he went to the [facility] doctor and was then referred to Dr. Eliseo Moya. He also had hand tingling afterwards. He was put on a blood thinner to prevent blood clots after having a negative ultrasound. He had a steroid shot in the knee with minimal benefit at this point.  No numbness in the foot reported. 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 2 stents, diabetes type II   Social History/Living Environment: lives in a house with family  Prior Level of Function/Work/Activity:independent / works at [employer]  Current Medications:   Aspirin, losartan, clopidogrel, omprezole, atovastatin, metoprolol, metformin, claratan   Date Last Reviewed:  12/19/2018   Number of Personal Factors/Comorbidities that affect the Plan of Care: 0: LOW COMPLEXITY   EXAMINATION:   (Abbreviations: P-pain, NP- no pain, wnl-within normal limits)    Observation/Orthostatic Postural Assessment:    Relaxed standing posture:  -     Palpation/tone/tissue texture:    ASIS: n/a  PSIS:n/a  Leg Length: supine:n/a         Long Sitting: n/a      Soft tissue:  · Hip flexors:mild tightness in R quad  · Hamstrings: increased tightness on R  · IT band: na  · gastroc/soleus/posterior tibialis: increased tightness on R (improving)  · Anterior tibialis: Increased tightness on R   (improved)    ROM:   Knee ROM Date:  11-19-18       Right Left   Flexion 135 140   Extension 0 deg  0      Ankle ROM Date:  11-19-18       Right Left   DF 10 10   PF wnl wnl   Inversion - -   Eversion - -           Strength:     Hip Strength Date:  11-19-18       Right Left   Flexion 5 5   Extension 5 5   IR n/a n/a   ER n/a n/a   Abduction 5 5      Knee Strength Date:  11-19-18       Right Left   Flexion 5 5   Extension 4 5             Special Tests:   Stability tests:  Lachmans: (-)  Slocums:  AM: (-)          AL: (-)  Posterior drawer: (-)  Posterior Sag: (-)    Varus: 0 deg: n/a today   30 deg: n/a today  Valgus: 0 deg: n/a today   30 deg: n/a today    Meniscus cluster test  1. End range flexion: -  2. End range extension: (-)  3. Joint line tenderness: medical joint line tenderness  (mild)  4. Report of locking: (-)  5.  McMurreys: n/a    Neurological Screen:   Myotomes: strong in bilateral LE's Dermatomes: intact in bilateral LE's         Reflexes: n/a         Neural Tension Tests: -  Functional Mobility:    · Double leg squat: able to do a function squat (does report increased pain with it  · Single leg squat:  L:functional        R: able to do a partial squat with increased pain  · Gait Pattern: ambulates with decreased terminal stance on R  (improved)  Balance:  Single leg   L: 10 sec, minimal sway  R: 10 sec, minimal sway        CLINICAL DECISION MAKING:   Outcome Measure: Tool Used: Lower Extremity Functional Scale (LEFS)  Score:  Initial: 27/80 43/80 (Date: 9-26-18 ) Most Recent: 36/80 (Date 11-19-18)   Interpretation of Score: 20 questions each scored on a 5 point scale with 0 representing \"extreme difficulty or unable to perform\" and 4 representing \"no difficulty\". The lower the score, the greater the functional disability. 80/80 represents no disability. Minimal detectable change is 9 points. Score 80 79-63 62-48 47-32 31-16 15-1 0   Modifier CH CI CJ CK CL CM CN     Medical Necessity:   · Patient is expected to demonstrate progress in strength, range of motion and symptom levels to return to full function. Reason for Services/Other Comments:  · Patient continues to require skilled intervention due to ongoing symptoms. TREATMENT:   (In addition to Assessment/Re-Assessment sessions the following treatments were rendered)    Subjective Pre-Treatment Symptoms: Reports that he is doing pretty good overall. Pain with stairs but otherwise doing well. Therapeutic Exercise: (30 min) Done in order to improve strength, ROM and understanding of current condition.     Date  12-12-18 Date  12-17-18 Date  12-19-18   Activity/Exercise      Education Discussed progressing with HEP Discussed HEP Discussed HEP   Biking 8 min for warm up 10 min 10 min for warm up   Taping · Taped cuboid into pronation, used 2 I strips of kinesio tape  · Taped patella into distraction using kinesio tape · Taped cuboid into pronation, used 2 I strips of kinesio tape  · Taped patella into distraction using kinesio tape · Taped cuboid into pronation, used 2 I strips of kinesio tape  · Taped patella into distraction using kinesio tape   ankle eversion Manual resistance, 10x, 2 sets, eccentric focus HEP HEP   Gait training   Worked on terminal stance control   Terminal knee extension w/ step up 6\", 8x  4\" 10x, 2 sets 4\", 10x, 3 sets 6\", 10x  4\", 10x, 2 sets   Ankle proprioception SL balance with anterior-posterior weight shift, 20 sec, 5x SL balance, anterior-posterior weight shift, 30 sec, 5x SL balance, step over, 10x, 2 sets, slow   Calf stretch 60 sec 60 sec 60 sec   Quad stretch 60 sec 60 sec 60 sec   Heel raises SL, 10x, 3 sets SL, 10x, 3 sets SL, 10x, 3 sets   Manual Therapy: (20) Done in order to improve joint and soft tissue mobility,reduce muscle guarding, and decrease muscle tone  · Patella mobilization, superior-inferior, medial-lateral and distraction using suction done at 0, 15, 45 and 90 deg flex  · Talocrural posterior-medial glide to facilitated DF, in supine   · Calcaneal distraction with DF of the ankle  · Midfoot mobilization while keeping cuboid stable   · Cuboid whip manipulation in prone, grade V  · Mobilize medial  Gastroc, done with active DF/PF (NOT DONE TODAY)  Modalities: ( 10 min) Done in order to reduce swelling and pain  Ice x 10min  Treatment/Session Assessment: Mr. Maxwell Reyes tolerated the session well. He was able to do a 6\" inch step for 10 reps with minor pain. We are continuing to gradually increase the height  · Pain: Initial:   No symptoms at rest, some stiffness  Post Session:  No pain at rest ·   Compliance with Program/Exercises: compliant   · Recommendations/Intent for next treatment session: \"Next visit will focus on progressing the patients plan of care\".     Future Appointments   Date Time Provider Alan Plunkett   12/31/2018  1:00 PM Phill Jolley, PT, DPT [facility]   1/2/2019 10:00 AM Jonathan Deloresy, PT, DPT [facility]   1/7/2019 10:00 AM Jonathan Hazy, PT, DPT [facility]   1/9/2019 10:00 AM Jonathan Hazy, PT, DPT [facility]   1/14/2019 10:00 AM Jonathan Hazy, PT, DPT [facility]   1/16/2019 10:45 AM Jonathan Norris, PT, DPT [facility]   1/23/2019 10:00 AM Jonathan Hazy, PT, DPT [facility]   1/25/2019 10:00 AM Jonathan Deloresy, PT, DPT [facility]   1/28/2019 10:00 AM Jonathan Norris, PT, DPT [facility]   1/30/2019 10:45 AM Jonathan Deloresy, PT, DPT [facility]   2/4/2019 10:00 AM [facility]   2/6/2019 10:00 AM Jonathan Deloresy, PT, DPT [facility]   2/11/2019 10:00 AM [facility]     Total Treatment Duration:60 min  PT Patient Time In/Time Out  Time In: 0945  Time Out: [address] PT, DPT

## 2018-12-31 ENCOUNTER — HOSPITAL ENCOUNTER (OUTPATIENT)
Dept: PHYSICAL THERAPY | Age: 53
Discharge: HOME | End: 2018-12-31
Payer: COMMERCIAL

## 2018-12-31 PROCEDURE — 97110 THERAPEUTIC EXERCISES: CPT

## 2018-12-31 PROCEDURE — 97140 MANUAL THERAPY 1/> REGIONS: CPT

## 2018-12-31 NOTE — PROGRESS NOTES
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 5 = High Risk)  MD Orders: evaluate and treat MEDICAL/REFERRING DIAGNOSIS:Right ankle sprain,chondromalacia right knee  DATE OF ONSET:   REFERRING PHYSICIAN:Georgina Deluna PA  RETURN PHYSICIAN APPOINTMENT: did not specify       ASSESSMENT:  Mr. Conor Teresa has come for a total of 17  visits since starting physical therapy on 18. During that time he reports a 50-60% overall improvement and has full ROM of the knee and ankle. His knee pain seems to be most related to patella compression and the lateral foot pain is mostly due to cuboid instability and peroneal tendinopath. He continues to not be able to tolerate squatting activities. Using taping to distract the patella and stabilize the cuboid helps some of the pain but does not fully take it away. At this point bracing should be considered to mimic the taping and potentially other medical interventions since significant improvements have not been seen in the last couple weeks. Skilled physical therapy is recommended to continue progressing his plan of care. PROBLEM LIST (Impacting functional limitations):  1. Decreased Strength  2. Decreased ADL/Functional Activities  3. Increased Pain  4. Decreased Activity Tolerance  5. Decreased Flexibility/Joint Mobility  6. Decreased [place] with Home Exercise Program INTERVENTIONS PLANNED:  1. Cold  2. Heat  3. Home Exercise Program (HEP)  4. Manual Therapy  5. Range of Motion (ROM)  6.  Therapeutic Exercise/Strengthening     TREATMENT PLAN:  Effective Dates:  18 TO 19  Frequency/Duration: 2 times a week for 8 wks  GOALS: (Goals have been discussed and agreed upon with patient.)  SHORT-TERM FUNCTIONAL GOALS: Time Frame: 2-4 wks  1. Patient will report 25-50% reduction in overall symptoms (MET)  2. Patient will be compliant with HEP and plan of care (MET)  3. Patient will have 0-125 deg ROM of the knee (MET)   4. Patient will improve swelling by 1 cm on the figure 8 measurement  (MET)  5. Patient will improve on the LEFS by 5-7 points (MET)  DISCHARGE GOALS: Time Frame: 10-12 wk  1. Patient will report % reduction in overall symptoms in order to be able to have full function with decreased symptoms (ongoing)  2. Patient will be able to squat with minimal pain as needed at his job with minimal pain (0-2/10) (ongoing)   3. Patient will be able to ambulate with a symmetrical gait pattern as judged by therapist in order to able to perform activities of daily living (ongoing)  4. Patient will score 60 or greater on the LEFS points in order to demonstrate improved function with less pain    Rehabilitation Potential For Stated Goals: Good    Regarding Nancy Mercury therapy, I certify that the treatment plan above will be carried out by a therapist or under their direction. Thank you for this referral,  Tutu Hernandez PT,DPT              HISTORY:   History of Present Injury/Illness (Reason for Referral): Reports that he fell at work on 8-1-18. He had puncture wounds in his lower leg. He had a lot of bleeding during that time requiring stiches in one of the wounds. He had an MRI on his knee revealed a meniscus tear. He went to the medical facility at [address] [place] [address] and they sent him to the ED where he got stiches for his wounds. The next day he went to the [facility] doctor and was then referred to Dr. Jadyn Haile. He also had hand tingling afterwards. He was put on a blood thinner to prevent blood clots after having a negative ultrasound. He had a steroid shot in the knee with minimal benefit at this point.  No numbness in the foot reported. 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 2 stents, diabetes type II   Social History/Living Environment: lives in a house with family  Prior Level of Function/Work/Activity:independent / works at [employer]  Current Medications:   Aspirin, losartan, clopidogrel, omprezole, atovastatin, metoprolol, metformin, claratan   Date Last Reviewed:  12/31/2018   Number of Personal Factors/Comorbidities that affect the Plan of Care: 0: LOW COMPLEXITY   EXAMINATION:   (Abbreviations: P-pain, NP- no pain, wnl-within normal limits)    Observation/Orthostatic Postural Assessment:    Relaxed standing posture:  -     Palpation/tone/tissue texture:    ASIS: n/a  PSIS:n/a  Leg Length: supine:n/a         Long Sitting: n/a      Soft tissue:  · Hip flexors:mild tightness in R quad  · Hamstrings: increased tightness on R  · IT band: na  · gastroc/soleus/posterior tibialis: increased tightness on R (improving)  · Anterior tibialis: Increased tightness on R   (improved)    ROM:   Knee ROM Date:  11-19-18       Right Left   Flexion 135 140   Extension 0 deg  0      Ankle ROM Date:  11-19-18       Right Left   DF 10 10   PF wnl wnl   Inversion - -   Eversion - -           Strength:     Hip Strength Date:  11-19-18       Right Left   Flexion 5 5   Extension 5 5   IR n/a n/a   ER n/a n/a   Abduction 5 5      Knee Strength Date:  11-19-18       Right Left   Flexion 5 5   Extension 4 5             Special Tests:   Stability tests:  Lachmans: (-)  Slocums:  AM: (-)          AL: (-)  Posterior drawer: (-)  Posterior Sag: (-)    Varus: 0 deg: n/a today   30 deg: n/a today  Valgus: 0 deg: n/a today   30 deg: n/a today    Meniscus cluster test  1. End range flexion: -  2. End range extension: (-)  3. Joint line tenderness: medical joint line tenderness  (mild)  4. Report of locking: (-)  5.  McMurreys: n/a    Neurological Screen:   Myotomes: strong in bilateral LE's Dermatomes: intact in bilateral LE's         Reflexes: n/a         Neural Tension Tests: -  Functional Mobility:    · Double leg squat: able to do a function squat (does report increased pain with it  · Single leg squat:  L:functional        R: able to do a partial squat with increased pain  · Gait Pattern: ambulates with decreased terminal stance on R  (improved)  Balance:  Single leg   L: 10 sec, minimal sway  R: 10 sec, minimal sway        CLINICAL DECISION MAKING:   Outcome Measure: Tool Used: Lower Extremity Functional Scale (LEFS)  Score:  Initial: 27/80 43/80 (Date: 9-26-18 ) Most Recent: 36/80 (Date 11-19-18)   Interpretation of Score: 20 questions each scored on a 5 point scale with 0 representing \"extreme difficulty or unable to perform\" and 4 representing \"no difficulty\". The lower the score, the greater the functional disability. 80/80 represents no disability. Minimal detectable change is 9 points. Score 80 79-63 62-48 47-32 31-16 15-1 0   Modifier CH CI CJ CK CL CM CN     Medical Necessity:   · Patient is expected to demonstrate progress in strength, range of motion and symptom levels to return to full function. Reason for Services/Other Comments:  · Patient continues to require skilled intervention due to ongoing symptoms. TREATMENT:   (In addition to Assessment/Re-Assessment sessions the following treatments were rendered)    Subjective Pre-Treatment Symptoms: Reports that he tweaked his knee while putting away the jorge alberto tree. States that his medial knee has been hurting since, it is some better but still there. Therapeutic Exercise: (25 min) Done in order to improve strength, ROM and understanding of current condition.     Date  12-17-18 Date  12-19-18 Date  12-31-18   Activity/Exercise      Education Discussed HEP Discussed HEP Discussed HEP   Biking 10 min 10 min for warm up Not done today   Taping · Taped cuboid into pronation, used 2 I strips of kinesio tape  · Taped patella into distraction using kinesio tape · Taped cuboid into pronation, used 2 I strips of kinesio tape  · Taped patella into distraction using kinesio tape · Taped cuboid into pronation, used 2 I strips of kinesio tape  · Taped patella into distraction using kinesio tape   ankle eversion HEP HEP Discussed to continue at home   Gait training  Worked on terminal stance control    Terminal knee extension w/ step up 4\", 10x, 3 sets 6\", 10x  4\", 10x, 2 sets 4\", 10x, 3 sets   Ankle proprioception SL balance, anterior-posterior weight shift, 30 sec, 5x SL balance, step over, 10x, 2 sets, slow -   Calf stretch 60 sec 60 sec 60 sec, 3x, worked on knee extended and bent   Quad stretch 60 sec 60 sec 60 sec,2x   Heel raises SL, 10x, 3 sets SL, 10x, 3 sets SL, 10x, 2sets   Manual Therapy: (20) Done in order to improve joint and soft tissue mobility,reduce muscle guarding, and decrease muscle tone  · Patella mobilization, superior-inferior, medial-lateral and distraction using suction done at 0, 15, 45 and 90 deg flex  · Talocrural posterior-medial glide to facilitated DF, in supine   · Calcaneal distraction with DF of the ankle  · Midfoot mobilization while keeping cuboid stable   · Cuboid whip manipulation in prone, grade V  · Mobilize medial  Gastroc, done with active DF/PF (NOT DONE TODAY)  Modalities: ( 10 min) Done in order to reduce swelling and pain  Ice x 10min  Treatment/Session Assessment: Mr. Latisha Headley tolerated the session well. He had increased medial joint line tenderness indicating most likely a cartilage defect, meniscal test were negative. · Pain: Initial:  Mild medial knee pain reported Post Session:  Mild medial knee pain reported ·   Compliance with Program/Exercises: compliant   · Recommendations/Intent for next treatment session: \"Next visit will focus on progressing the patients plan of care\".     Future Appointments   Date Time Provider Alan Plunkett   1/2/2019 10:00 AM Giovana Mcdaniel, PT, DPT [facility]   1/7/2019 10:00 AM Kalie Valladares PT, DPT [facility]   1/9/2019 10:00 AM Kalie Valladares PT, DPT [facility]   1/14/2019 10:00 AM Kalie Valladares PT, DPT [facility]   1/16/2019 10:45 AM Kalie Valladares PT, DPT [facility]   1/23/2019 10:00 AM Kalie Valladares PT, DPT [facility]   1/25/2019 10:00 AM Kalie Valladares PT, DPT [facility]   1/28/2019 10:00 [facility]   1/30/2019 10:45 AM Kalie Valladares PT, DPT [facility]   2/4/2019 10:00 AM Kalie Valladares PT, DPT [facility]   2/6/2019 10:00 AM Kalie Valladares PT, DPT [facility]   2/11/2019 10:00 AM Kalie Valladares PT, DPT [facility]     Total Treatment Duration:55 min  PT Patient Time In/Time Out  Time In: 1310  Time Out: 300 [facility] PT, DPT

## 2019-01-02 ENCOUNTER — HOSPITAL ENCOUNTER (OUTPATIENT)
Dept: PHYSICAL THERAPY | Age: 54
Discharge: HOME | End: 2019-01-02
Payer: COMMERCIAL

## 2019-01-02 PROCEDURE — 97110 THERAPEUTIC EXERCISES: CPT

## 2019-01-02 PROCEDURE — 97140 MANUAL THERAPY 1/> REGIONS: CPT

## 2019-01-02 NOTE — PROGRESS NOTES
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 5 = High Risk)  MD Orders: evaluate and treat MEDICAL/REFERRING DIAGNOSIS:Right ankle sprain,chondromalacia right knee  DATE OF ONSET:   REFERRING PHYSICIAN:Fabiola Deluna PA  RETURN PHYSICIAN APPOINTMENT: did not specify       ASSESSMENT:  Mr. Sebastian Gómez has come for a total of 17  visits since starting physical therapy on 18. During that time he reports a 50-60% overall improvement and has full ROM of the knee and ankle. His knee pain seems to be most related to patella compression and the lateral foot pain is mostly due to cuboid instability and peroneal tendinopath. He continues to not be able to tolerate squatting activities. Using taping to distract the patella and stabilize the cuboid helps some of the pain but does not fully take it away. At this point bracing should be considered to mimic the taping and potentially other medical interventions since significant improvements have not been seen in the last couple weeks. Skilled physical therapy is recommended to continue progressing his plan of care. PROBLEM LIST (Impacting functional limitations):  1. Decreased Strength  2. Decreased ADL/Functional Activities  3. Increased Pain  4. Decreased Activity Tolerance  5. Decreased Flexibility/Joint Mobility  6. Decreased [place] with Home Exercise Program INTERVENTIONS PLANNED:  1. Cold  2. Heat  3. Home Exercise Program (HEP)  4. Manual Therapy  5. Range of Motion (ROM)  6.  Therapeutic Exercise/Strengthening     TREATMENT PLAN:  Effective Dates:  18 TO 19  Frequency/Duration: 2 times a week for 8 wks  GOALS: (Goals have been discussed and agreed upon with patient.)  SHORT-TERM FUNCTIONAL GOALS: Time Frame: 2-4 wks  1. Patient will report 25-50% reduction in overall symptoms (MET)  2. Patient will be compliant with HEP and plan of care (MET)  3. Patient will have 0-125 deg ROM of the knee (MET)   4. Patient will improve swelling by 1 cm on the figure 8 measurement  (MET)  5. Patient will improve on the LEFS by 5-7 points (MET)  DISCHARGE GOALS: Time Frame: 10-12 wk  1. Patient will report % reduction in overall symptoms in order to be able to have full function with decreased symptoms (ongoing)  2. Patient will be able to squat with minimal pain as needed at his job with minimal pain (0-2/10) (ongoing)   3. Patient will be able to ambulate with a symmetrical gait pattern as judged by therapist in order to able to perform activities of daily living (ongoing)  4. Patient will score 60 or greater on the LEFS points in order to demonstrate improved function with less pain    Rehabilitation Potential For Stated Goals: Good    Regarding Monae Lewis therapy, I certify that the treatment plan above will be carried out by a therapist or under their direction. Thank you for this referral,  Saritha Nash PT,DPT              HISTORY:   History of Present Injury/Illness (Reason for Referral): Reports that he fell at work on 8-1-18. He had puncture wounds in his lower leg. He had a lot of bleeding during that time requiring stiches in one of the wounds. He had an MRI on his knee revealed a meniscus tear. He went to the medical facility at [facility] and they sent him to the ED where he got stiches for his wounds. The next day he went to the [facility] well doctor and was then referred to Dr. Sandra Abbasi. He also had hand tingling afterwards. He was put on a blood thinner to prevent blood clots after having a negative ultrasound. He had a steroid shot in the knee with minimal benefit at this point.  No numbness in the foot reported. 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 2 stents, diabetes type II   Social History/Living Environment: lives in a house with family  Prior Level of Function/Work/Activity:independent / works at [employer]  Current Medications:   Aspirin, losartan, clopidogrel, omprezole, atovastatin, metoprolol, metformin, claratan   Date Last Reviewed:  1/2/2019   Number of Personal Factors/Comorbidities that affect the Plan of Care: 0: LOW COMPLEXITY   EXAMINATION:   (Abbreviations: P-pain, NP- no pain, wnl-within normal limits)    Observation/Orthostatic Postural Assessment:    Relaxed standing posture:  -     Palpation/tone/tissue texture:    ASIS: n/a  PSIS:n/a  Leg Length: supine:n/a         Long Sitting: n/a      Soft tissue:  · Hip flexors:mild tightness in R quad  · Hamstrings: increased tightness on R  · IT band: na  · gastroc/soleus/posterior tibialis: increased tightness on R (improving)  · Anterior tibialis: Increased tightness on R   (improved)    ROM:   Knee ROM Date:  11-19-18       Right Left   Flexion 135 140   Extension 0 deg  0      Ankle ROM Date:  11-19-18       Right Left   DF 10 10   PF wnl wnl   Inversion - -   Eversion - -           Strength:     Hip Strength Date:  11-19-18       Right Left   Flexion 5 5   Extension 5 5   IR n/a n/a   ER n/a n/a   Abduction 5 5      Knee Strength Date:  11-19-18       Right Left   Flexion 5 5   Extension 4 5             Special Tests:   Stability tests:  Lachmans: (-)  Slocums:  AM: (-)          AL: (-)  Posterior drawer: (-)  Posterior Sag: (-)    Varus: 0 deg: n/a today   30 deg: n/a today  Valgus: 0 deg: n/a today   30 deg: n/a today    Meniscus cluster test  1. End range flexion: -  2. End range extension: (-)  3. Joint line tenderness: medical joint line tenderness  (mild)  4. Report of locking: (-)  5.  McMurreys: n/a    Neurological Screen:   Myotomes: strong in bilateral LE's Dermatomes: intact in bilateral LE's         Reflexes: n/a         Neural Tension Tests: -  Functional Mobility:    · Double leg squat: able to do a function squat (does report increased pain with it  · Single leg squat:  L:functional        R: able to do a partial squat with increased pain  · Gait Pattern: ambulates with decreased terminal stance on R  (improved)  Balance:  Single leg   L: 10 sec, minimal sway  R: 10 sec, minimal sway        CLINICAL DECISION MAKING:   Outcome Measure: Tool Used: Lower Extremity Functional Scale (LEFS)  Score:  Initial: 27/80 43/80 (Date: 9-26-18 ) Most Recent: 36/80 (Date 11-19-18)   Interpretation of Score: 20 questions each scored on a 5 point scale with 0 representing \"extreme difficulty or unable to perform\" and 4 representing \"no difficulty\". The lower the score, the greater the functional disability. 80/80 represents no disability. Minimal detectable change is 9 points. Score 80 79-63 62-48 47-32 31-16 15-1 0   Modifier CH CI CJ CK CL CM CN     Medical Necessity:   · Patient is expected to demonstrate progress in strength, range of motion and symptom levels to return to full function. Reason for Services/Other Comments:  · Patient continues to require skilled intervention due to ongoing symptoms. TREATMENT:   (In addition to Assessment/Re-Assessment sessions the following treatments were rendered)    Subjective Pre-Treatment Symptoms: Reports that he is doing pretty good. Pain is better in the knee but still feels it some. Therapeutic Exercise: (30 min) Done in order to improve strength, ROM and understanding of current condition.     Date  12-31-18 Date  1-2-19   Activity/Exercise     Education Discussed HEP Discussed HEP   Biking Not done today 10 min warm up   Taping · Taped cuboid into pronation, used 2 I strips of kinesio tape  · Taped patella into distraction using kinesio tape · Taped cuboid into pronation, used 2 I strips of kinesio tape  · Taped patella into distraction using kinesio tape   ankle eversion Discussed to continue at home -   Gait training  -   Terminal knee extension w/ step up 4\", 10x, 3 sets 4\", 10x, 3 sets   Ankle proprioception - Step over, 10x, 2 sets   Calf stretch 60 sec, 3x, worked on knee extended and bent 60 sec, 3x   Quad stretch 60 sec,2x 60 sec, 2x   Heel raises SL, 10x, 2sets SL, 10x, 2 sets   Manual Therapy: (15 min) Done in order to improve joint and soft tissue mobility,reduce muscle guarding, and decrease muscle tone  · Patella mobilization, superior-inferior, medial-lateral and distraction using suction done at 0, 15, 45 and 90 deg flex  · Talocrural posterior-medial glide to facilitated DF, in supine   · Calcaneal distraction with DF of the ankle  · Midfoot mobilization while keeping cuboid stable   · Cuboid whip manipulation in prone, grade V  · Mobilize medial  Gastroc, done with active DF/PF (NOT DONE TODAY)  Modalities: ( 15 min) Done in order to reduce swelling and pain  Ice x 15 min  Treatment/Session Assessment: Mr. Denis Sheikh tolerated the session well. He was more tolerant of the step ups today. Patella mobility was also better. He was educated to buy a sink plunger so he can perform self mobilizations. · Pain: Initial: no pain at rset Post Session:  No pain at rest ·   Compliance with Program/Exercises: compliant   · Recommendations/Intent for next treatment session: \"Next visit will focus on progressing the patients plan of care\".     Future Appointments   Date Time Provider Alan Plunkett   1/7/2019 10:00 AM Toribio Martinez, PT, RUPERTO [facility]   1/9/2019 10:00 AM Toribio Martinez, PT, DPT [facility]   1/14/2019 10:00 AM Toribio Martienz PT, DPUZMA [facility]   1/16/2019 10:45 AM Toribio Martinez, PT, [facility]UP[facility]O [facility]   1/23/2019 10:00 AM Toribio Martinez, PT, DPT [facility]   1/25/2019 10:00 AM Toribio Martinez PT, DPUZMA [facility]   1/28/2019 10:00 AM [facility]   1/30/2019 10:45 AM [facility]   2/4/2019 10:00 AM Raudel Linares PT, DPT [facility]   2/6/2019 10:00 AM [facility]   2/11/2019 10:00 AM [facility]res [facility]     Total Treatment Duration:55 min  PT Patient Time In/Time Out  Time In: 1000  Time Out: 800 Enrike Mota PT, THELMAT

## 2019-01-07 ENCOUNTER — HOSPITAL ENCOUNTER (OUTPATIENT)
Dept: PHYSICAL THERAPY | Age: 54
Discharge: HOME | End: 2019-01-07
Payer: COMMERCIAL

## 2019-01-07 NOTE — PROGRESS NOTES
Niall Hager : 1965 Primary: Andres Rivas Secondary:  Therapy Center at [facility], [place] [address] [phone]   [phone] OUTPATIENT DAILY NOTE 
 
NAME/AGE/GENDER: Niall Hager is a 52 y.o. male. DATE: 2019 Patient cancelled his appointment today due to a schedule conflict. Will plan to follow up on next scheduled visit.  
 
Shawna Vasquez, PT, DPT

## 2019-01-09 ENCOUNTER — HOSPITAL ENCOUNTER (OUTPATIENT)
Dept: PHYSICAL THERAPY | Age: 54
Discharge: HOME | End: 2019-01-09
Payer: COMMERCIAL

## 2019-01-09 PROCEDURE — 97140 MANUAL THERAPY 1/> REGIONS: CPT

## 2019-01-09 PROCEDURE — 97110 THERAPEUTIC EXERCISES: CPT

## 2019-01-09 NOTE — PROGRESS NOTES
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 5 = High Risk)  MD Orders: evaluate and treat MEDICAL/REFERRING DIAGNOSIS:Right ankle sprain,chondromalacia right knee  DATE OF ONSET:   REFERRING PHYSICIAN:Krystle Deluna PA  RETURN PHYSICIAN APPOINTMENT: did not specify       ASSESSMENT:  Mr. Harman Ritter has come for a total of 17  visits since starting physical therapy on 18. During that time he reports a 50-60% overall improvement and has full ROM of the knee and ankle. His knee pain seems to be most related to patella compression and the lateral foot pain is mostly due to cuboid instability and peroneal tendinopath. He continues to not be able to tolerate squatting activities. Using taping to distract the patella and stabilize the cuboid helps some of the pain but does not fully take it away. At this point bracing should be considered to mimic the taping and potentially other medical interventions since significant improvements have not been seen in the last couple weeks. Skilled physical therapy is recommended to continue progressing his plan of care. PROBLEM LIST (Impacting functional limitations):  1. Decreased Strength  2. Decreased ADL/Functional Activities  3. Increased Pain  4. Decreased Activity Tolerance  5. Decreased Flexibility/Joint Mobility  6. Decreased Crowder with Home Exercise Program INTERVENTIONS PLANNED:  1. Cold  2. Heat  3. Home Exercise Program (HEP)  4. Manual Therapy  5. Range of Motion (ROM)  6.  Therapeutic Exercise/Strengthening     TREATMENT PLAN:  Effective Dates:  18 TO 19  Frequency/Duration: 2 times a week for 8 wks  GOALS: (Goals have been discussed and agreed upon with patient.)  SHORT-TERM FUNCTIONAL GOALS: Time Frame: 2-4 wks  1. Patient will report 25-50% reduction in overall symptoms (MET)  2. Patient will be compliant with HEP and plan of care (MET)  3. Patient will have 0-125 deg ROM of the knee (MET)   4. Patient will improve swelling by 1 cm on the figure 8 measurement  (MET)  5. Patient will improve on the LEFS by 5-7 points (MET)  DISCHARGE GOALS: Time Frame: 10-12 wk  1. Patient will report % reduction in overall symptoms in order to be able to have full function with decreased symptoms (ongoing)  2. Patient will be able to squat with minimal pain as needed at his job with minimal pain (0-2/10) (ongoing)   3. Patient will be able to ambulate with a symmetrical gait pattern as judged by therapist in order to able to perform activities of daily living (ongoing)  4. Patient will score 60 or greater on the LEFS points in order to demonstrate improved function with less pain    Rehabilitation Potential For Stated Goals: Good    Regarding Marvin Vega therapy, I certify that the treatment plan above will be carried out by a therapist or under their direction. Thank you for this referral,  Hannah Suarez PT,DPT              HISTORY:   History of Present Injury/Illness (Reason for Referral): Reports that he fell at work on 8-1-18. He had puncture wounds in his lower leg. He had a lot of bleeding during that time requiring stiches in one of the wounds. He had an MRI on his knee revealed a meniscus tear. He went to the medical facility at [address] [place] [address] and they sent him to the ED where he got stiches for his wounds. The next day he went to the [facility] doctor and was then referred to Dr. Jose Wetzel. He also had hand tingling afterwards. He was put on a blood thinner to prevent blood clots after having a negative ultrasound. He had a steroid shot in the knee with minimal benefit at this point.  No numbness in the foot reported. 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 2 stents, diabetes type II   Social History/Living Environment: lives in a house with family  Prior Level of Function/Work/Activity:independent / works at [employer]  Current Medications:   Aspirin, losartan, clopidogrel, omprezole, atovastatin, metoprolol, metformin, claratan   Date Last Reviewed:  1/9/2019   Number of Personal Factors/Comorbidities that affect the Plan of Care: 0: LOW COMPLEXITY   EXAMINATION:   (Abbreviations: P-pain, NP- no pain, wnl-within normal limits)    Observation/Orthostatic Postural Assessment:    Relaxed standing posture:  -     Palpation/tone/tissue texture:    ASIS: n/a  PSIS:n/a  Leg Length: supine:n/a         Long Sitting: n/a      Soft tissue:  · Hip flexors:mild tightness in R quad  · Hamstrings: increased tightness on R  · IT band: na  · gastroc/soleus/posterior tibialis: increased tightness on R (improving)  · Anterior tibialis: Increased tightness on R   (improved)    ROM:   Knee ROM Date:  11-19-18       Right Left   Flexion 135 140   Extension 0 deg  0      Ankle ROM Date:  11-19-18       Right Left   DF 10 10   PF wnl wnl   Inversion - -   Eversion - -           Strength:     Hip Strength Date:  11-19-18       Right Left   Flexion 5 5   Extension 5 5   IR n/a n/a   ER n/a n/a   Abduction 5 5      Knee Strength Date:  11-19-18       Right Left   Flexion 5 5   Extension 4 5             Special Tests:   Stability tests:  Lachmans: (-)  Slocums:  AM: (-)          AL: (-)  Posterior drawer: (-)  Posterior Sag: (-)    Varus: 0 deg: n/a today   30 deg: n/a today  Valgus: 0 deg: n/a today   30 deg: n/a today    Meniscus cluster test  1. End range flexion: -  2. End range extension: (-)  3. Joint line tenderness: medical joint line tenderness  (mild)  4. Report of locking: (-)  5.  McMurreys: n/a    Neurological Screen:   Myotomes: strong in bilateral LE's Dermatomes: intact in bilateral LE's         Reflexes: n/a         Neural Tension Tests: -  Functional Mobility:    · Double leg squat: able to do a function squat (does report increased pain with it  · Single leg squat:  L:functional        R: able to do a partial squat with increased pain  · Gait Pattern: ambulates with decreased terminal stance on R  (improved)  Balance:  Single leg   L: 10 sec, minimal sway  R: 10 sec, minimal sway        CLINICAL DECISION MAKING:   Outcome Measure: Tool Used: Lower Extremity Functional Scale (LEFS)  Score:  Initial: 27/80 43/80 (Date: 9-26-18 ) Most Recent: 36/80 (Date 11-19-18)   Interpretation of Score: 20 questions each scored on a 5 point scale with 0 representing \"extreme difficulty or unable to perform\" and 4 representing \"no difficulty\". The lower the score, the greater the functional disability. 80/80 represents no disability. Minimal detectable change is 9 points. Score 80 79-63 62-48 47-32 31-16 15-1 0   Modifier CH CI CJ CK CL CM CN     Medical Necessity:   · Patient is expected to demonstrate progress in strength, range of motion and symptom levels to return to full function. Reason for Services/Other Comments:  · Patient continues to require skilled intervention due to ongoing symptoms. TREATMENT:   (In addition to Assessment/Re-Assessment sessions the following treatments were rendered)    Subjective Pre-Treatment Symptoms: Reports that he was sore after our last session for several days but during the wknd he was able go down the steps at [employer] with minimal symptoms which is a good improvement. Therapeutic Exercise: (45 min) Done in order to improve strength, ROM and understanding of current condition.     Date  12-31-18 Date  1-2-19 Date  1-9-19   Activity/Exercise      Education Discussed HEP Discussed HEP Discussed hEP   Biking Not done today 10 min warm up 10 min warm up   Taping · Taped cuboid into pronation, used 2 I strips of kinesio tape  · Taped patella into distraction using kinesio tape · Taped cuboid into pronation, used 2 I strips of kinesio tape  · Taped patella into distraction using kinesio tape · Taped cuboid into pronation, used 2 I strips of kinesio tape  · Taped patella into distraction using kinesio tape   ankle eversion Discussed to continue at home - HEP   Gait training  - -   Terminal knee extension w/ step up 4\", 10x, 3 sets 4\", 10x, 3 sets 6\", 10x, 2 sets  4\" ,10x   Ankle proprioception - Step over, 10x, 2 sets Step over, 10x, 2 sets   Calf stretch 60 sec, 3x, worked on knee extended and bent 60 sec, 3x 60 sec, 2x   Quad stretch 60 sec,2x 60 sec, 2x 60 sec   Heel raises SL, 10x, 2sets SL, 10x, 2 sets SL, 10x, 3 sets   Manual Therapy: (15 min) Done in order to improve joint and soft tissue mobility,reduce muscle guarding, and decrease muscle tone  · Patella mobilization, superior-inferior, medial-lateral and distraction using suction done at 0, 15, 45 and 90 deg flex  · Talocrural posterior-medial glide to facilitated DF, in supine   · Calcaneal distraction with DF of the ankle  · Midfoot mobilization while keeping cuboid stable   · Cuboid whip manipulation in prone, grade V  · Mobilize medial  Gastroc, done with active DF/PF (NOT DONE TODAY)  Modalities: ( 15 min) Done in order to reduce swelling and pain  Ice x 15 min  Treatment/Session Assessment: Mr. Mariluz Patel tolerated the session well. We are progressing with strength slowly. His patella mobility was pretty good today and continues to improve. Continuing to work on strength and ROM. · Pain: Initial: no pain at rset Post Session:  No pain at rest ·   Compliance with Program/Exercises: compliant   · Recommendations/Intent for next treatment session: \"Next visit will focus on progressing the patients plan of care\".     Future Appointments   Date Time Provider Alan Kiarra   1/11/2019 10:00 AM Felicita Gottlieb PT, DPT [facility]   1/14/2019 10:00 AM [facility]   1/16/2019 10:45 AM Leafy Mould, PT, [facility]   1/23/2019 10:00 AM Leafy Mould, PT, [facility]   1/25/2019 10:00 AM Leaf[facility] Mould, PT, [facility]   1/28/2019 10:00 AM [facility]   1/30/2019 10:45 AM [facility] Mould, PT[facility]   2/4/2019 10:00 AM Leaf[facility] Mould, PT[facility]   2/6/2019 10:00 AM Leaf[facility] Mould, PT[facility]   2/11/2019 10:00 AM Leafy Mould, PT[facility]     Total Treatment Duration:75 min  PT Patient Time In/Time Out  Time In: 1000  Time Out: 106 Ewa Horne PT, DPT

## 2019-01-11 ENCOUNTER — HOSPITAL ENCOUNTER (OUTPATIENT)
Dept: PHYSICAL THERAPY | Age: 54
Discharge: HOME | End: 2019-01-11
Payer: COMMERCIAL

## 2019-01-11 PROCEDURE — 97110 THERAPEUTIC EXERCISES: CPT

## 2019-01-11 PROCEDURE — 97140 MANUAL THERAPY 1/> REGIONS: CPT

## 2019-01-11 NOTE — PROGRESS NOTES
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 5 = High Risk)  MD Orders: evaluate and treat MEDICAL/REFERRING DIAGNOSIS:Right ankle sprain,chondromalacia right knee  DATE OF ONSET:   REFERRING PHYSICIAN:Pavel Deluna PA  RETURN PHYSICIAN APPOINTMENT: did not specify       ASSESSMENT:  Mr. Jennifer Felix has come for a total of 17  visits since starting physical therapy on 18. During that time he reports a 50-60% overall improvement and has full ROM of the knee and ankle. His knee pain seems to be most related to patella compression and the lateral foot pain is mostly due to cuboid instability and peroneal tendinopath. He continues to not be able to tolerate squatting activities. Using taping to distract the patella and stabilize the cuboid helps some of the pain but does not fully take it away. At this point bracing should be considered to mimic the taping and potentially other medical interventions since significant improvements have not been seen in the last couple weeks. Skilled physical therapy is recommended to continue progressing his plan of care. PROBLEM LIST (Impacting functional limitations):  1. Decreased Strength  2. Decreased ADL/Functional Activities  3. Increased Pain  4. Decreased Activity Tolerance  5. Decreased Flexibility/Joint Mobility  6. Decreased Amelia with Home Exercise Program INTERVENTIONS PLANNED:  1. Cold  2. Heat  3. Home Exercise Program (HEP)  4. Manual Therapy  5. Range of Motion (ROM)  6.  Therapeutic Exercise/Strengthening     TREATMENT PLAN:  Effective Dates:  18 TO 19  Frequency/Duration: 2 times a week for 8 wks  GOALS: (Goals have been discussed and agreed upon with patient.)  SHORT-TERM FUNCTIONAL GOALS: Time Frame: 2-4 wks  1. Patient will report 25-50% reduction in overall symptoms (MET)  2. Patient will be compliant with HEP and plan of care (MET)  3. Patient will have 0-125 deg ROM of the knee (MET)   4. Patient will improve swelling by 1 cm on the figure 8 measurement  (MET)  5. Patient will improve on the LEFS by 5-7 points (MET)  DISCHARGE GOALS: Time Frame: 10-12 wk  1. Patient will report % reduction in overall symptoms in order to be able to have full function with decreased symptoms (ongoing)  2. Patient will be able to squat with minimal pain as needed at his job with minimal pain (0-2/10) (ongoing)   3. Patient will be able to ambulate with a symmetrical gait pattern as judged by therapist in order to able to perform activities of daily living (ongoing)  4. Patient will score 60 or greater on the LEFS points in order to demonstrate improved function with less pain    Rehabilitation Potential For Stated Goals: Good    Regarding Lisbeth Borges therapy, I certify that the treatment plan above will be carried out by a therapist or under their direction. Thank you for this referral,  Gely Landaverde PT,DPT              HISTORY:   History of Present Injury/Illness (Reason for Referral): Reports that he fell at work on 8-1-18. He had puncture wounds in his lower leg. He had a lot of bleeding during that time requiring stiches in one of the wounds. He had an MRI on his knee revealed a meniscus tear. He went to the medical facility at [address] [place] [address] and they sent him to the ED where he got stiches for his wounds. The next day he went to the [facility] doctor and was then referred to Dr. Zora Barlow. He also had hand tingling afterwards. He was put on a blood thinner to prevent blood clots after having a negative ultrasound. He had a steroid shot in the knee with minimal benefit at this point.  No numbness in the foot reported. 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 2 stents, diabetes type II   Social History/Living Environment: lives in a house with family  Prior Level of Function/Work/Activity:independent / works at [employer]  Current Medications:   Aspirin, losartan, clopidogrel, omprezole, atovastatin, metoprolol, metformin, claratan   Date Last Reviewed:  1/11/2019   Number of Personal Factors/Comorbidities that affect the Plan of Care: 0: LOW COMPLEXITY   EXAMINATION:   (Abbreviations: P-pain, NP- no pain, wnl-within normal limits)    Observation/Orthostatic Postural Assessment:    Relaxed standing posture:  -     Palpation/tone/tissue texture:    ASIS: n/a  PSIS:n/a  Leg Length: supine:n/a         Long Sitting: n/a      Soft tissue:  · Hip flexors:mild tightness in R quad  · Hamstrings: increased tightness on R  · IT band: na  · gastroc/soleus/posterior tibialis: increased tightness on R (improving)  · Anterior tibialis: Increased tightness on R   (improved)    ROM:   Knee ROM Date:  11-19-18       Right Left   Flexion 135 140   Extension 0 deg  0      Ankle ROM Date:  11-19-18       Right Left   DF 10 10   PF wnl wnl   Inversion - -   Eversion - -           Strength:     Hip Strength Date:  11-19-18       Right Left   Flexion 5 5   Extension 5 5   IR n/a n/a   ER n/a n/a   Abduction 5 5      Knee Strength Date:  11-19-18       Right Left   Flexion 5 5   Extension 4 5             Special Tests:   Stability tests:  Lachmans: (-)  Slocums:  AM: (-)          AL: (-)  Posterior drawer: (-)  Posterior Sag: (-)    Varus: 0 deg: n/a today   30 deg: n/a today  Valgus: 0 deg: n/a today   30 deg: n/a today    Meniscus cluster test  1. End range flexion: -  2. End range extension: (-)  3. Joint line tenderness: medical joint line tenderness  (mild)  4. Report of locking: (-)  5.  McMurreys: n/a    Neurological Screen:   Myotomes: strong in bilateral LE's Dermatomes: intact in bilateral LE's         Reflexes: n/a         Neural Tension Tests: -  Functional Mobility:    · Double leg squat: able to do a function squat (does report increased pain with it  · Single leg squat:  L:functional        R: able to do a partial squat with increased pain  · Gait Pattern: ambulates with decreased terminal stance on R  (improved)  Balance:  Single leg   L: 10 sec, minimal sway  R: 10 sec, minimal sway        CLINICAL DECISION MAKING:   Outcome Measure: Tool Used: Lower Extremity Functional Scale (LEFS)  Score:  Initial: 27/80 43/80 (Date: 9-26-18 ) Most Recent: 36/80 (Date 11-19-18)   Interpretation of Score: 20 questions each scored on a 5 point scale with 0 representing \"extreme difficulty or unable to perform\" and 4 representing \"no difficulty\". The lower the score, the greater the functional disability. 80/80 represents no disability. Minimal detectable change is 9 points. Score 80 79-63 62-48 47-32 31-16 15-1 0   Modifier CH CI CJ CK CL CM CN     Medical Necessity:   · Patient is expected to demonstrate progress in strength, range of motion and symptom levels to return to full function. Reason for Services/Other Comments:  · Patient continues to require skilled intervention due to ongoing symptoms. TREATMENT:   (In addition to Assessment/Re-Assessment sessions the following treatments were rendered)    Subjective Pre-Treatment Symptoms: Reports that he is doing pretty good. Only minor soreness after the last session. Therapeutic Exercise: (35 min) Done in order to improve strength, ROM and understanding of current condition.     Date  1-2-19 Date  1-9-19 Date  1-11-19   Activity/Exercise      Education Discussed HEP Discussed hEP Discussed HEP   Biking 10 min warm up 10 min warm up 10 min warm up   Taping · Taped cuboid into pronation, used 2 I strips of kinesio tape  · Taped patella into distraction using kinesio tape · Taped cuboid into pronation, used 2 I strips of kinesio tape  · Taped patella into distraction using kinesio tape · Taped cuboid into pronation, used 2 I strips of kinesio tape  · Taped patella into distraction using kinesio tape   ankle eversion - HEP    Gait training - -    Terminal knee extension w/ step up 4\", 10x, 3 sets 6\", 10x, 2 sets  4\" ,10x 6\", 10x, 3 sets   Ankle proprioception Step over, 10x, 2 sets Step over, 10x, 2 sets Step over, 10x, 3 sets   Calf stretch 60 sec, 3x 60 sec, 2x 60 sec, 2x   Quad stretch 60 sec, 2x 60 sec 2 min   Heel raises SL, 10x, 2 sets SL, 10x, 3 sets SL, 10x, 3 sets   Manual Therapy: (25 min) Done in order to improve joint and soft tissue mobility,reduce muscle guarding, and decrease muscle tone  · Patella mobilization, superior-inferior, medial-lateral and distraction using suction done at 0, 15, 45 and 90 deg flex  · Talocrural posterior-medial glide to facilitated DF, in supine   · Calcaneal distraction with DF of the ankle  · Midfoot mobilization while keeping cuboid stable   · Cuboid whip manipulation in prone, grade V  · Mobilize medial  Gastroc and soleus, done with active DF/PF  Modalities: ( 15 min) Done in order to reduce swelling and pain  Ice x 15 min  Treatment/Session Assessment: Mr. Rodger Ayoub tolerated the session well. We were able to do 6\" step for 3 sets with only minor pain. Foot mobility is improving especially in the cuboid. Continuing to progress his plan of care  · Pain: Initial: no pain at rset Post Session:  Minor soreness after session ·   Compliance with Program/Exercises: compliant   · Recommendations/Intent for next treatment session: \"Next visit will focus on progressing the patients plan of care\".     Future Appointments   Date Time Provider Alan Plunkett   1/14/2019 10:00 AM Rossana Palmer PT, DPT [facility]   1/16/2019 10:45 AM Rossana Palmer PT, DPT [facility]   1/23/2019 10:00 AM Rossana Palmer PT, DPT [facility]   1/25/2019 10:00 AM Jarocho [facility]   1/28/2019 10:00 AM Andrea Santos PT, DPT [facility]   1/30/2019 10:45 AM Andrea Santos PT, DPT [facility]   2/4/2019 10:00 AM Andrea Santos PT, DPT [facility]   2/6/2019 10:00 AM Andrea Santos PT, DPT [facility]   2/11/2019 10:00 AM Andrea Santos PT, DPT [facility]     Total Treatment Duration:75 min  PT Patient Time In/Time Out  Time In: 1000  Time Out: 106 Ewa Horne PT, DPT

## 2019-01-14 ENCOUNTER — HOSPITAL ENCOUNTER (OUTPATIENT)
Dept: PHYSICAL THERAPY | Age: 54
Discharge: HOME | End: 2019-01-14
Payer: COMMERCIAL

## 2019-01-14 PROCEDURE — 97140 MANUAL THERAPY 1/> REGIONS: CPT

## 2019-01-14 PROCEDURE — 97110 THERAPEUTIC EXERCISES: CPT

## 2019-01-14 PROCEDURE — 97164 PT RE-EVAL EST PLAN CARE: CPT

## 2019-01-14 NOTE — THERAPY RECERTIFICATION
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 5 = High Risk)  MD Orders: evaluate and treat MEDICAL/REFERRING DIAGNOSIS:Right ankle sprain,chondromalacia right knee  DATE OF ONSET:   REFERRING PHYSICIAN:Marylin Deluna PA  RETURN PHYSICIAN APPOINTMENT: did not specify       ASSESSMENT:  Mr. Greg Chance has come for a total of 29  visits since starting physical therapy on 18. During that time he reports a 75% overall improvement. Objectively he has full knee and foot ROM. His patella-femoral joint still has tightness but better overall and he is tolerating doing step ups on a 6\" step which is a good improvement. He still has slight cuboid and peroneal tendon pain but that is also much better overall. Therapy should start to slowly be tapered off and a return to work program should be implemented. Thank you. PROBLEM LIST (Impacting functional limitations):  1. Decreased Strength  2. Decreased ADL/Functional Activities  3. Increased Pain  4. Decreased Activity Tolerance  5. Decreased Flexibility/Joint Mobility  6. Decreased [place] with Home Exercise Program INTERVENTIONS PLANNED:  1. Cold  2. Heat  3. Home Exercise Program (HEP)  4. Manual Therapy  5. Range of Motion (ROM)  6. Therapeutic Exercise/Strengthening     TREATMENT PLAN:  Effective Dates:  19 TO 3-14-19  Frequency/Duration: 2 times a week for 4-8 wks  GOALS: (Goals have been discussed and agreed upon with patient.)  SHORT-TERM FUNCTIONAL GOALS: Time Frame: 2-4 wks  1. Patient will report 25-50% reduction in overall symptoms (MET)  2.   Patient will be compliant with HEP and plan of care (MET)  3. Patient will have 0-125 deg ROM of the knee (MET)   4. Patient will improve swelling by 1 cm on the figure 8 measurement  (MET)  5. Patient will improve on the LEFS by 5-7 points (MET)  DISCHARGE GOALS: Time Frame: 10-12 wk  1. Patient will report % reduction in overall symptoms in order to be able to have full function with decreased symptoms (MET)  2. Patient will be able to squat with minimal pain as needed at his job with minimal pain (0-2/10) (able to do a partial squat but not deep squats)  3. Patient will be able to ambulate with a symmetrical gait pattern as judged by therapist in order to able to perform activities of daily living (MET)  4. Patient will score 60 or greater on the LEFS points in order to demonstrate improved function with less pain (ongoing)     Rehabilitation Potential For Stated Goals: Good    Regarding Larry Favor therapy, I certify that the treatment plan above will be carried out by a therapist or under their direction. Thank you for this referral,  Chao Nye, PT, DPT     Referring Physician Signature: SHAWN Barrios          Date                    HISTORY:   History of Present Injury/Illness (Reason for Referral): Reports that he fell at work on 8-1-18. He had puncture wounds in his lower leg. He had a lot of bleeding during that time requiring stiches in one of the wounds. He had an MRI on his knee revealed a meniscus tear. He went to the medical facility at [address] [place] [address] & [facility] and they sent him to the ED where he got stiches for his wounds. The next day he went to the [facility] well doctor and was then referred to Dr. Jose Wetzel. He also had hand tingling afterwards. He was put on a blood thinner to prevent blood clots after having a negative ultrasound. He had a steroid shot in the knee with minimal benefit at this point. No numbness in the foot reported. 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 2 stents, diabetes type II   Social History/Living Environment: lives in a house with family  Prior Level of Function/Work/Activity:independent / works at [employer]  Current Medications:   Aspirin, losartan, clopidogrel, omprezole, atovastatin, metoprolol, metformin, claratan   Date Last Reviewed:  1/14/2019   Number of Personal Factors/Comorbidities that affect the Plan of Care: 0: LOW COMPLEXITY   EXAMINATION:   (Abbreviations: P-pain, NP- no pain, wnl-within normal limits)    Observation/Orthostatic Postural Assessment:    Relaxed standing posture:  -     Palpation/tone/tissue texture:    ASIS: n/a  PSIS:n/a  Leg Length: supine:n/a         Long Sitting: n/a      Patella femoral joint: R increased tightness compared to left but much improved from initial  Soft tissue:  · Hip flexors:mild tightness in R quad  · Hamstrings: increased tightness on R  · IT band: na  · gastroc/soleus/posterior tibialis: increased tightness on R (improving but still tight)  · Anterior tibialis: Increased tightness on R   (improved)    ROM:   Knee ROM Date:  1-14-19       Right Left   Flexion 138 140   Extension 0 deg  0      Ankle ROM Date:  1-14-19       Right Left   DF 10  (increased talocrural talocrural hypomobility)  15   PF wnl wnl   Inversion wnl wnl   Eversion wnl wnll           Strength:     Hip Strength Date:  11-19-18       Right Left   Flexion 5 5   Extension 5 5   IR n/a n/a   ER n/a n/a   Abduction 5 5      Knee Strength Date:  1-14-19       Right Left   Flexion 5 5   Extension 4+ 5             Special Tests:   Stability tests:  Lachmans: (-)  Slocums:  AM: (-)          AL: (-)  Posterior drawer: (-)  Posterior Sag: (-)    Varus: 0 deg: n/a today   30 deg: n/a today  Valgus: 0 deg: n/a today   30 deg: n/a today    Meniscus cluster test  1. End range flexion: -  2. End range extension: (-)  3. Joint line tenderness: medical joint line tenderness  (mild)  4. Report of locking: (-)  5.  Steve: n/a    Neurological Screen:   Myotomes: strong in bilateral LE's     Dermatomes: intact in bilateral LE's         Reflexes: n/a         Neural Tension Tests: -  Functional Mobility:    · Double leg squat: able to do a functional squat with minimal pain but does have increased pain with more repetitions   · Single leg squat:  L:functional        R: able to do a 6\" step up with minimal pain  · Gait Pattern: ambulates with decreased terminal stance on R  (improved)  Balance:  Single leg   L: 10 sec, minimal sway  R: 10 sec, minimal sway        CLINICAL DECISION MAKING:   Outcome Measure: Tool Used: Lower Extremity Functional Scale (LEFS)  Score:  Initial: 27/80 43/80 (Date: 9-26-18 ) 36/80 (Date 11-19-18) Most Recent: 43/80  (Date 1-14-19)   Interpretation of Score: 20 questions each scored on a 5 point scale with 0 representing \"extreme difficulty or unable to perform\" and 4 representing \"no difficulty\". The lower the score, the greater the functional disability. 80/80 represents no disability. Minimal detectable change is 9 points. Score 80 79-63 62-48 47-32 31-16 15-1 0   Modifier CH CI CJ CK CL CM CN     Medical Necessity:   · Patient is expected to demonstrate progress in strength, range of motion and symptom levels to return to full function. Reason for Services/Other Comments:  · Patient continues to require skilled intervention due to ongoing symptoms. TREATMENT:   (In addition to Assessment/Re-Assessment sessions the following treatments were rendered)    Subjective Pre-Treatment Symptoms: Reports that he is doing pretty good. Minor soreness. States his ankle felt more stiff over the wknd. Therapeutic Exercise: (30 min) Done in order to improve strength, ROM and understanding of current condition.     Date  1-11-19 Date  1-14-19   Activity/Exercise     Education Discussed HEP Discussed HEP, plan of care to include discussing a time table for return to work with his doctor   Biking 10 min warm up 10 min warm up   Taping · Taped cuboid into pronation, used 2 I strips of kinesio tape  · Taped patella into distraction using kinesio tape · Taped cuboid into pronation, used 2 I strips of kinesio tape  · Taped patella into distraction using kinesio tape   ankle eversion  HEP   Gait training     Terminal knee extension w/ step up 6\", 10x, 3 sets 6\", 10x, 3 sets   Ankle proprioception Step over, 10x, 3 sets Step over, 10x, 3 sets   Calf stretch 60 sec, 2x 60 sec, 3x, knees straight and bent   Quad stretch 2 min 60 sec   Heel raises SL, 10x, 3 sets 10x   Manual Therapy: (15 min) Done in order to improve joint and soft tissue mobility,reduce muscle guarding, and decrease muscle tone  · Patella mobilization, superior-inferior, medial-lateral and distraction using suction done at 0, 15, 45 and 90 deg flex  · Talocrural posterior-medial glide to facilitated DF, in supine   · Calcaneal distraction with DF of the ankle  · Midfoot mobilization while keeping cuboid stable   · Cuboid whip manipulation in prone, grade V  · Mobilize medial  Gastroc and soleus, done with active DF/PF (not done today)  Modalities: ( 10 min) Done in order to reduce swelling and pain  Ice x 10 min  Treatment/Session Assessment: Mr. Rodger Ayoub tolerated the session well. Progressing well overall. Discussed to start the conversation with his doctor about a return to work timeline. We will continue progressing his plan of care until then. · Pain: Initial: no pain at rest, minor ankle stiffness at rest Post Session:  Minor soreness after session ·   Compliance with Program/Exercises: compliant   · Recommendations/Intent for next treatment session: \"Next visit will focus on progressing the patients plan of care\".     Future Appointments   Date Time Provider Alan Plunkett   1/16/2019 10:45 AM [facility]   1/23/2019 10:00 AM Rossana Palmer PT, DPT [facility]   1/25/2019 10:00 [facility]   1/28/2019 10:00 AM [facility]   1/30/2019 10:45 AM [facility] Severe[facility]   2/4/2019 10:00 AM Marty Severe, [facility]   2/6/2019 10:00 AM Marty Severe[facility]   2/11/2019 10:00 AM Marty Severe, PT, DPT [facility]     Total Treatment Duration:55 min, re-eval 15 min  PT Patient Time In/Time Out  Time In: 1000  Time Out: 202 Fred Ramirez PT, DPT

## 2019-01-16 ENCOUNTER — HOSPITAL ENCOUNTER (OUTPATIENT)
Dept: PHYSICAL THERAPY | Age: 54
Discharge: HOME | End: 2019-01-16
Payer: COMMERCIAL

## 2019-01-16 PROCEDURE — 97110 THERAPEUTIC EXERCISES: CPT

## 2019-01-16 PROCEDURE — 97140 MANUAL THERAPY 1/> REGIONS: CPT

## 2019-01-16 NOTE — PROGRESS NOTES
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 5 = High Risk)  MD Orders: evaluate and treat MEDICAL/REFERRING DIAGNOSIS:Right ankle sprain,chondromalacia right knee  DATE OF ONSET:   REFERRING PHYSICIAN:Andreea Deluna PA  RETURN PHYSICIAN APPOINTMENT: did not specify       ASSESSMENT:  Mr. Shuan Smith has come for a total of 29  visits since starting physical therapy on 18. During that time he reports a 75% overall improvement. Objectively he has full knee and foot ROM. His patella-femoral joint still has tightness but better overall and he is tolerating doing step ups on a 6\" step which is a good improvement. He still has slight cuboid and peroneal tendon pain but that is also much better overall. Therapy should start to slowly be tapered off and a return to work program should be implemented. Thank you. PROBLEM LIST (Impacting functional limitations):  1. Decreased Strength  2. Decreased ADL/Functional Activities  3. Increased Pain  4. Decreased Activity Tolerance  5. Decreased Flexibility/Joint Mobility  6. Decreased [place] with Home Exercise Program INTERVENTIONS PLANNED:  1. Cold  2. Heat  3. Home Exercise Program (HEP)  4. Manual Therapy  5. Range of Motion (ROM)  6. Therapeutic Exercise/Strengthening     TREATMENT PLAN:  Effective Dates:  19 TO 3-14-19  Frequency/Duration: 2 times a week for 4-8 wks  GOALS: (Goals have been discussed and agreed upon with patient.)  SHORT-TERM FUNCTIONAL GOALS: Time Frame: 2-4 wks  1. Patient will report 25-50% reduction in overall symptoms (MET)  2.   Patient will be compliant with HEP and plan of care (MET)  3. Patient will have 0-125 deg ROM of the knee (MET)   4. Patient will improve swelling by 1 cm on the figure 8 measurement  (MET)  5. Patient will improve on the LEFS by 5-7 points (MET)  DISCHARGE GOALS: Time Frame: 10-12 wk  1. Patient will report % reduction in overall symptoms in order to be able to have full function with decreased symptoms (MET)  2. Patient will be able to squat with minimal pain as needed at his job with minimal pain (0-2/10) (able to do a partial squat but not deep squats)  3. Patient will be able to ambulate with a symmetrical gait pattern as judged by therapist in order to able to perform activities of daily living (MET)  4. Patient will score 60 or greater on the LEFS points in order to demonstrate improved function with less pain (ongoing)     Rehabilitation Potential For Stated Goals: Good    Regarding [facility] therapy, I certify that the treatment plan above will be carried out by a therapist or under their direction. Thank you for this referral,  Leander Gregg, PT, DPT               HISTORY:   History of Present Injury/Illness (Reason for Referral): Reports that he fell at work on 8-1-18. He had puncture wounds in his lower leg. He had a lot of bleeding during that time requiring stiches in one of the wounds. He had an MRI on his knee revealed a meniscus tear. He went to the medical facility at [address] [place] [address] and they sent him to the ED where he got stiches for his wounds. The next day he went to the work well doctor and was then referred to Dr. Max Shah. He also had hand tingling afterwards. He was put on a blood thinner to prevent blood clots after having a negative ultrasound. He had a steroid shot in the knee with minimal benefit at this point. No numbness in the foot reported. 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 2 stents, diabetes type II   Social History/Living Environment: lives in a house with family  Prior Level of Function/Work/Activity:independent / works at [employer]  Current Medications:   Aspirin, losartan, clopidogrel, omprezole, atovastatin, metoprolol, metformin, claratan   Date Last Reviewed:  1/16/2019   Number of Personal Factors/Comorbidities that affect the Plan of Care: 0: LOW COMPLEXITY   EXAMINATION:   (Abbreviations: P-pain, NP- no pain, wnl-within normal limits)    Observation/Orthostatic Postural Assessment:    Relaxed standing posture:  -     Palpation/tone/tissue texture:    ASIS: n/a  PSIS:n/a  Leg Length: supine:n/a         Long Sitting: n/a      Patella femoral joint: R increased tightness compared to left but much improved from initial  Soft tissue:  · Hip flexors:mild tightness in R quad  · Hamstrings: increased tightness on R  · IT band: na  · gastroc/soleus/posterior tibialis: increased tightness on R (improving but still tight)  · Anterior tibialis: Increased tightness on R   (improved)    ROM:   Knee ROM Date:  1-14-19       Right Left   Flexion 138 140   Extension 0 deg  0      Ankle ROM Date:  1-14-19       Right Left   DF 10  (increased talocrural talocrural hypomobility)  15   PF wnl wnl   Inversion wnl wnl   Eversion wnl wnll           Strength:     Hip Strength Date:  11-19-18       Right Left   Flexion 5 5   Extension 5 5   IR n/a n/a   ER n/a n/a   Abduction 5 5      Knee Strength Date:  1-14-19       Right Left   Flexion 5 5   Extension 4+ 5             Special Tests:   Stability tests:  Lachmans: (-)  Slocums:  AM: (-)          AL: (-)  Posterior drawer: (-)  Posterior Sag: (-)    Varus: 0 deg: n/a today   30 deg: n/a today  Valgus: 0 deg: n/a today   30 deg: n/a today    Meniscus cluster test  1. End range flexion: -  2. End range extension: (-)  3. Joint line tenderness: medical joint line tenderness  (mild)  4. Report of locking: (-)  5.  McMurreys: n/a    Neurological Screen:   Myotomes: strong in bilateral LE's     Dermatomes: intact in bilateral LE's Reflexes: n/a         Neural Tension Tests: -  Functional Mobility:    · Double leg squat: able to do a functional squat with minimal pain but does have increased pain with more repetitions   · Single leg squat:  L:functional        R: able to do a 6\" step up with minimal pain  · Gait Pattern: ambulates with decreased terminal stance on R  (improved)  Balance:  Single leg   L: 10 sec, minimal sway  R: 10 sec, minimal sway        CLINICAL DECISION MAKING:   Outcome Measure: Tool Used: Lower Extremity Functional Scale (LEFS)  Score:  Initial: 27/80 43/80 (Date: 9-26-18 ) 36/80 (Date 11-19-18) Most Recent: 43/80  (Date 1-14-19)   Interpretation of Score: 20 questions each scored on a 5 point scale with 0 representing \"extreme difficulty or unable to perform\" and 4 representing \"no difficulty\". The lower the score, the greater the functional disability. 80/80 represents no disability. Minimal detectable change is 9 points. Score 80 79-63 62-48 47-32 31-16 15-1 0   Modifier CH CI CJ CK CL CM CN     Medical Necessity:   · Patient is expected to demonstrate progress in strength, range of motion and symptom levels to return to full function. Reason for Services/Other Comments:  · Patient continues to require skilled intervention due to ongoing symptoms. TREATMENT:   (In addition to Assessment/Re-Assessment sessions the following treatments were rendered)    Subjective Pre-Treatment Symptoms: Reports that he is doing pretty good. Minimal symptoms at rest    Therapeutic Exercise: (30 min) Done in order to improve strength, ROM and understanding of current condition.     Date  1-14-19 Date  1-16-19   Activity/Exercise     Education Discussed HEP, plan of care to include discussing a time table for return to work with his doctor Discussed HEP   Biking 10 min warm up 10 min warm up   Taping · Taped cuboid into pronation, used 2 I strips of kinesio tape  · Taped patella into distraction using kinesio tape · Taped cuboid into pronation, used 2 I strips of kinesio tape  · Taped patella into distraction using kinesio tape   ankle eversion HEP Isometric hold, 10 sec, 2x   Gait training  -   Terminal knee extension w/ step up 6\", 10x, 3 sets 8\", 6x (had increased pain)  6\", 10x, 2 sets   Ankle proprioception Step over, 10x, 3 sets Step over,10x, 3 sets, unstable surface   Calf stretch 60 sec, 3x, knees straight and bent 60 sec, 3x   Quad stretch 60 sec 60 sec, 2x   Heel raises 10x 10x, 2 sets, SL   Manual Therapy: (25 min) Done in order to improve joint and soft tissue mobility,reduce muscle guarding, and decrease muscle tone  · Patella mobilization, superior-inferior, medial-lateral and distraction using suction done at 0, 15, 45 and 90 deg flex  · Talocrural posterior-medial glide to facilitated DF, in supine   · Calcaneal distraction with DF of the ankle  · Midfoot mobilization while keeping cuboid stable   · Cuboid whip manipulation in prone, grade V  · Mobilize medial  Gastroc and soleus, done with active DF/PF (not done today)  Modalities: ( 10 min) Done in order to reduce swelling and pain  Ice x 10 min  Treatment/Session Assessment: Mr. Ortega Lopez tolerated the session well. Progressing well overall. Discussed his options going forwards and importance of getting on a management strategy for the knee. · Pain: Initial: no pain at rest, minor ankle stiffness at rest Post Session:  Minor soreness after session ·   Compliance with Program/Exercises: compliant   · Recommendations/Intent for next treatment session: \"Next visit will focus on progressing the patients plan of care\".     Future Appointments   Date Time Provider Alan Plunkett   1/23/2019 10:00 AM Jie Chavez PT, [facility]PT [facility]   1/25/2019 10:00 AM Jie Chavez PT, DPT [facility]   1/28/2019 10:00 AM Jie Chavez PT, [facility]PT [facility]   1/30/2019 10:45 AM Jie Chavez PT DPT [facility]   2/4/2019 10:00 AM Jarocho RUPERTO [facility]   2/6/2019 10:00 AM Ben[facility] Velez PT, [facility]   2/11/2019 10:00 AM Benja Velez PT, DPT [facility]     Total Treatment Duration:65 min  PT Patient Time In/Time Out  Time In: 1045  Time Out: 1305 [facility] PT, RUPERTO

## 2019-01-23 ENCOUNTER — HOSPITAL ENCOUNTER (OUTPATIENT)
Dept: PHYSICAL THERAPY | Age: 54
Discharge: HOME | End: 2019-01-23
Payer: COMMERCIAL

## 2019-01-23 PROCEDURE — 97110 THERAPEUTIC EXERCISES: CPT

## 2019-01-23 PROCEDURE — 97140 MANUAL THERAPY 1/> REGIONS: CPT

## 2019-01-23 NOTE — PROGRESS NOTES
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 5 = High Risk)  MD Orders: evaluate and treat MEDICAL/REFERRING DIAGNOSIS:Right ankle sprain,chondromalacia right knee  DATE OF ONSET:   REFERRING PHYSICIAN:Michelle Deluna PA  RETURN PHYSICIAN APPOINTMENT: did not specify       ASSESSMENT:  Mr. Yogesh Nunez has come for a total of 29  visits since starting physical therapy on 18. During that time he reports a 75% overall improvement. Objectively he has full knee and foot ROM. His patella-femoral joint still has tightness but better overall and he is tolerating doing step ups on a 6\" step which is a good improvement. He still has slight cuboid and peroneal tendon pain but that is also much better overall. Therapy should start to slowly be tapered off and a return to work program should be implemented. Thank you. PROBLEM LIST (Impacting functional limitations):  1. Decreased Strength  2. Decreased ADL/Functional Activities  3. Increased Pain  4. Decreased Activity Tolerance  5. Decreased Flexibility/Joint Mobility  6. Decreased [place] with Home Exercise Program INTERVENTIONS PLANNED:  1. Cold  2. Heat  3. Home Exercise Program (HEP)  4. Manual Therapy  5. Range of Motion (ROM)  6. Therapeutic Exercise/Strengthening     TREATMENT PLAN:  Effective Dates:  19 TO 3-14-19  Frequency/Duration: 2 times a week for 4-8 wks  GOALS: (Goals have been discussed and agreed upon with patient.)  SHORT-TERM FUNCTIONAL GOALS: Time Frame: 2-4 wks  1. Patient will report 25-50% reduction in overall symptoms (MET)  2.   Patient will be compliant with HEP and plan of care (MET)  3. Patient will have 0-125 deg ROM of the knee (MET)   4. Patient will improve swelling by 1 cm on the figure 8 measurement  (MET)  5. Patient will improve on the LEFS by 5-7 points (MET)  DISCHARGE GOALS: Time Frame: 10-12 wk  1. Patient will report % reduction in overall symptoms in order to be able to have full function with decreased symptoms (MET)  2. Patient will be able to squat with minimal pain as needed at his job with minimal pain (0-2/10) (able to do a partial squat but not deep squats)  3. Patient will be able to ambulate with a symmetrical gait pattern as judged by therapist in order to able to perform activities of daily living (MET)  4. Patient will score 60 or greater on the LEFS points in order to demonstrate improved function with less pain (ongoing)     Rehabilitation Potential For Stated Goals: Good    Regarding Chace Craig therapy, I certify that the treatment plan above will be carried out by a therapist or under their direction. Thank you for this referral,  Elvin Peñaloza, PT, DPT               HISTORY:   History of Present Injury/Illness (Reason for Referral): Reports that he fell at work on 8-1-18. He had puncture wounds in his lower leg. He had a lot of bleeding during that time requiring stiches in one of the wounds. He had an MRI on his knee revealed a meniscus tear. He went to the medical facility at [address] [place] [address] and they sent him to the ED where he got stiches for his wounds. The next day he went to the work well doctor and was then referred to Dr. Davide Tellez. He also had hand tingling afterwards. He was put on a blood thinner to prevent blood clots after having a negative ultrasound. He had a steroid shot in the knee with minimal benefit at this point. No numbness in the foot reported. 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 2 stents, diabetes type II   Social History/Living Environment: lives in a house with family  Prior Level of Function/Work/Activity:independent / works at [employer]  Current Medications:   Aspirin, losartan, clopidogrel, omprezole, atovastatin, metoprolol, metformin, claratan   Date Last Reviewed:  1/23/2019   Number of Personal Factors/Comorbidities that affect the Plan of Care: 0: LOW COMPLEXITY   EXAMINATION:   (Abbreviations: P-pain, NP- no pain, wnl-within normal limits)    Observation/Orthostatic Postural Assessment:    Relaxed standing posture:  -     Palpation/tone/tissue texture:    ASIS: n/a  PSIS:n/a  Leg Length: supine:n/a         Long Sitting: n/a      Patella femoral joint: R increased tightness compared to left but much improved from initial  Soft tissue:  · Hip flexors:mild tightness in R quad  · Hamstrings: increased tightness on R  · IT band: na  · gastroc/soleus/posterior tibialis: increased tightness on R (improving but still tight)  · Anterior tibialis: Increased tightness on R   (improved)    ROM:   Knee ROM Date:  1-14-19       Right Left   Flexion 138 140   Extension 0 deg  0      Ankle ROM Date:  1-14-19       Right Left   DF 10  (increased talocrural talocrural hypomobility)  15   PF wnl wnl   Inversion wnl wnl   Eversion wnl wnll           Strength:     Hip Strength Date:  11-19-18       Right Left   Flexion 5 5   Extension 5 5   IR n/a n/a   ER n/a n/a   Abduction 5 5      Knee Strength Date:  1-14-19       Right Left   Flexion 5 5   Extension 4+ 5             Special Tests:   Stability tests:  Lachmans: (-)  Slocums:  AM: (-)          AL: (-)  Posterior drawer: (-)  Posterior Sag: (-)    Varus: 0 deg: n/a today   30 deg: n/a today  Valgus: 0 deg: n/a today   30 deg: n/a today    Meniscus cluster test  1. End range flexion: -  2. End range extension: (-)  3. Joint line tenderness: medical joint line tenderness  (mild)  4. Report of locking: (-)  5.  McMurreys: n/a    Neurological Screen:   Myotomes: strong in bilateral LE's     Dermatomes: intact in bilateral LE's Reflexes: n/a         Neural Tension Tests: -  Functional Mobility:    · Double leg squat: able to do a functional squat with minimal pain but does have increased pain with more repetitions   · Single leg squat:  L:functional        R: able to do a 6\" step up with minimal pain  · Gait Pattern: ambulates with decreased terminal stance on R  (improved)  Balance:  Single leg   L: 10 sec, minimal sway  R: 10 sec, minimal sway        CLINICAL DECISION MAKING:   Outcome Measure: Tool Used: Lower Extremity Functional Scale (LEFS)  Score:  Initial: 27/80 43/80 (Date: 9-26-18 ) 36/80 (Date 11-19-18) Most Recent: 43/80  (Date 1-14-19)   Interpretation of Score: 20 questions each scored on a 5 point scale with 0 representing \"extreme difficulty or unable to perform\" and 4 representing \"no difficulty\". The lower the score, the greater the functional disability. 80/80 represents no disability. Minimal detectable change is 9 points. Score 80 79-63 62-48 47-32 31-16 15-1 0   Modifier CH CI CJ CK CL CM CN     Medical Necessity:   · Patient is expected to demonstrate progress in strength, range of motion and symptom levels to return to full function. Reason for Services/Other Comments:  · Patient continues to require skilled intervention due to ongoing symptoms. TREATMENT:   (In addition to Assessment/Re-Assessment sessions the following treatments were rendered)    Subjective Pre-Treatment Symptoms: Reports that he went to [facility] over the wknd and had some pain walking around on uneven surfaces but otherwise is doing well. Therapeutic Exercise: (30 min) Done in order to improve strength, ROM and understanding of current condition.     Date  1-14-19 Date  1-16-19 Date  1-23-19   Activity/Exercise      Education Discussed HEP, plan of care to include discussing a time table for return to work with his doctor Discussed HEP Discussed HEP   Biking 10 min warm up 10 min warm up 10 min warm up   Taping · Taped cuboid into pronation, used 2 I strips of kinesio tape  · Taped patella into distraction using kinesio tape · Taped cuboid into pronation, used 2 I strips of kinesio tape  · Taped patella into distraction using kinesio tape · Taped cuboid into pronation, used 2 I strips of kinesio tape  · Taped patella into distraction using kinesio tape   ankle eversion HEP Isometric hold, 10 sec, 2x HEP   Gait training  -    Terminal knee extension w/ step up 6\", 10x, 3 sets 8\", 6x (had increased pain)  6\", 10x, 2 sets 6\" , 10x, 3 sets   Ankle proprioception Step over, 10x, 3 sets Step over,10x, 3 sets, unstable surface Step over, unstable surface, 10x, 2 ets   Calf stretch 60 sec, 3x, knees straight and bent 60 sec, 3x 60 sec, 2x   Quad stretch 60 sec 60 sec, 2x 60 sec   Heel raises 10x 10x, 2 sets, SL 10x, 2 sets, SL   Manual Therapy: (25 min) Done in order to improve joint and soft tissue mobility,reduce muscle guarding, and decrease muscle tone  · Patella mobilization, superior-inferior, medial-lateral and distraction using suction done at 0, 15, 45 and 90 deg flex  · Talocrural posterior-medial glide to facilitated DF, in supine   · Calcaneal distraction with DF of the ankle  · Midfoot mobilization while keeping cuboid stable   · Cuboid whip manipulation in prone, grade V  · Mobilize medial  Gastroc and soleus, done with active DF/PF (not done today)  Modalities: ( 10 min) Done in order to reduce swelling and pain  Ice x 10 min  Treatment/Session Assessment: Mr. Savana Mercado tolerated the session well. Continues to progress slowly. He purchased his own patella mobilizing suction tool and tape so he can continue managing the condition at home. Discussed his HEP. · Pain: Initial: no pain at rest, minor ankle stiffness at rest Post Session:  Minor soreness after session ·   Compliance with Program/Exercises: compliant   · Recommendations/Intent for next treatment session:  \"Next visit will focus on progressing the patients plan of care\".     Future Appointments   Date Time Provider Alan Kiarra   1/25/2019 10:00 AM Dulce Dunlap, PT, DPT [facility]   1/28/2019 10:00 AM Dulce Dunlap PT, DPT [facility]   1/30/2019 10:45 AM Dulce Dunlap, PT, DPT [facility]   2/4/2019 10:00 AM Dulce Dunlap PT, DPT [facility]   2/6/2019 10:00 AM Dulce Dunlap PT, DPT [facility]   2/11/2019 10:00 AM [facility]     Total Treatment Duration:65 min  PT Patient Time In/Time Out  Time In: 1000  Time Out: 202 Fred Ramirez PT, DPT Administered By (Optional): MELLY Judd

## 2019-01-25 ENCOUNTER — APPOINTMENT (OUTPATIENT)
Dept: PHYSICAL THERAPY | Age: 54
End: 2019-01-25
Payer: COMMERCIAL

## 2019-01-28 ENCOUNTER — HOSPITAL ENCOUNTER (OUTPATIENT)
Dept: PHYSICAL THERAPY | Age: 54
Discharge: HOME | End: 2019-01-28
Payer: COMMERCIAL

## 2019-01-28 PROCEDURE — 97110 THERAPEUTIC EXERCISES: CPT

## 2019-01-28 PROCEDURE — 97140 MANUAL THERAPY 1/> REGIONS: CPT

## 2019-01-28 NOTE — PROGRESS NOTES
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 5 = High Risk)  MD Orders: evaluate and treat MEDICAL/REFERRING DIAGNOSIS:Right ankle sprain,chondromalacia right knee  DATE OF ONSET:   REFERRING PHYSICIAN:Montserrat Deluna PA  RETURN PHYSICIAN APPOINTMENT: did not specify       ASSESSMENT:  Mr. Sharlene Blackwell has come for a total of 29  visits since starting physical therapy on 18. During that time he reports a 75% overall improvement. Objectively he has full knee and foot ROM. His patella-femoral joint still has tightness but better overall and he is tolerating doing step ups on a 6\" step which is a good improvement. He still has slight cuboid and peroneal tendon pain but that is also much better overall. Therapy should start to slowly be tapered off and a return to work program should be implemented. Thank you. PROBLEM LIST (Impacting functional limitations):  1. Decreased Strength  2. Decreased ADL/Functional Activities  3. Increased Pain  4. Decreased Activity Tolerance  5. Decreased Flexibility/Joint Mobility  6. Decreased [place] with Home Exercise Program INTERVENTIONS PLANNED:  1. Cold  2. Heat  3. Home Exercise Program (HEP)  4. Manual Therapy  5. Range of Motion (ROM)  6. Therapeutic Exercise/Strengthening     TREATMENT PLAN:  Effective Dates:  19 TO 3-14-19  Frequency/Duration: 2 times a week for 4-8 wks  GOALS: (Goals have been discussed and agreed upon with patient.)  SHORT-TERM FUNCTIONAL GOALS: Time Frame: 2-4 wks  1. Patient will report 25-50% reduction in overall symptoms (MET)  2.   Patient will be compliant with HEP and plan of care (MET)  3. Patient will have 0-125 deg ROM of the knee (MET)   4. Patient will improve swelling by 1 cm on the figure 8 measurement  (MET)  5. Patient will improve on the LEFS by 5-7 points (MET)  DISCHARGE GOALS: Time Frame: 10-12 wk  1. Patient will report % reduction in overall symptoms in order to be able to have full function with decreased symptoms (MET)  2. Patient will be able to squat with minimal pain as needed at his job with minimal pain (0-2/10) (able to do a partial squat but not deep squats)  3. Patient will be able to ambulate with a symmetrical gait pattern as judged by therapist in order to able to perform activities of daily living (MET)  4. Patient will score 60 or greater on the LEFS points in order to demonstrate improved function with less pain (ongoing)     Rehabilitation Potential For Stated Goals: Good    Regarding Bandar Brenner therapy, I certify that the treatment plan above will be carried out by a therapist or under their direction. Thank you for this referral,  Santos Smith, PT, DPT               HISTORY:    History of Present Injury/Illness (Reason for Referral): Reports that he fell at work on 8-1-18. He had puncture wounds in his lower leg. He had a lot of bleeding during that time requiring stiches in one of the wounds. He had an MRI on his knee revealed a meniscus tear. He went to the medical facility at [address] [place] [address] and they sent him to the ED where he got stiches for his wounds. The next day he went to the [facility] well doctor and was then referred to Dr. Jadyn Haile. He also had hand tingling afterwards. He was put on a blood thinner to prevent blood clots after having a negative ultrasound. He had a steroid shot in the knee with minimal benefit at this point. No numbness in the foot reported. 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 2 stents, diabetes type II   Social History/Living Environment: lives in a house with family  Prior Level of Function/Work/Activity:independent / works at [employer]  Current Medications:   Aspirin, losartan, clopidogrel, omprezole, atovastatin, metoprolol, metformin, claratan   Date Last Reviewed:  1/28/2019    Number of Personal Factors/Comorbidities that affect the Plan of Care: 0: LOW COMPLEXITY    EXAMINATION:    (Abbreviations: P-pain, NP- no pain, wnl-within normal limits)    Observation/Orthostatic Postural Assessment:    Relaxed standing posture:  -     Palpation/tone/tissue texture:    ASIS: n/a  PSIS:n/a  Leg Length: supine:n/a         Long Sitting: n/a      Patella femoral joint: R increased tightness compared to left but much improved from initial  Soft tissue:  · Hip flexors:mild tightness in R quad  · Hamstrings: increased tightness on R  · IT band: na  · gastroc/soleus/posterior tibialis: increased tightness on R (improving but still tight)  · Anterior tibialis: Increased tightness on R   (improved)    ROM:   Knee ROM Date:  1-14-19       Right Left   Flexion 138 140   Extension 0 deg  0      Ankle ROM Date:  1-14-19       Right Left   DF 10  (increased talocrural talocrural hypomobility)  15   PF wnl wnl   Inversion wnl wnl   Eversion wnl wnll           Strength:     Hip Strength Date:  11-19-18       Right Left   Flexion 5 5   Extension 5 5   IR n/a n/a   ER n/a n/a   Abduction 5 5      Knee Strength Date:  1-14-19       Right Left   Flexion 5 5   Extension 4+ 5             Special Tests:   Stability tests:  Lachmans: (-)  Slocums:  AM: (-)          AL: (-)  Posterior drawer: (-)  Posterior Sag: (-)    Varus: 0 deg: n/a today   30 deg: n/a today  Valgus: 0 deg: n/a today   30 deg: n/a today    Meniscus cluster test  1. End range flexion: -  2. End range extension: (-)  3. Joint line tenderness: medical joint line tenderness  (mild)  4. Report of locking: (-)  5.  McMurreys: n/a    Neurological Screen:   Myotomes: strong in bilateral LE's     Dermatomes: intact in bilateral LE's Reflexes: n/a         Neural Tension Tests: -  Functional Mobility:    · Double leg squat: able to do a functional squat with minimal pain but does have increased pain with more repetitions   · Single leg squat:  L:functional        R: able to do a 6\" step up with minimal pain  · Gait Pattern: ambulates with decreased terminal stance on R  (improved)  Balance:  Single leg   L: 10 sec, minimal sway  R: 10 sec, minimal sway         CLINICAL DECISION MAKING:    Outcome Measure: Tool Used: Lower Extremity Functional Scale (LEFS)  Score:  Initial: 27/80 43/80 (Date: 9-26-18 ) 36/80 (Date 11-19-18) Most Recent: 43/80  (Date 1-14-19)   Interpretation of Score: 20 questions each scored on a 5 point scale with 0 representing \"extreme difficulty or unable to perform\" and 4 representing \"no difficulty\". The lower the score, the greater the functional disability. 80/80 represents no disability. Minimal detectable change is 9 points. Score 80 79-63 62-48 47-32 31-16 15-1 0   Modifier CH CI CJ CK CL CM CN     Medical Necessity:   · Patient is expected to demonstrate progress in strength, range of motion and symptom levels to return to full function. Reason for Services/Other Comments:  · Patient continues to require skilled intervention due to ongoing symptoms. ·    TREATMENT:    (In addition to Assessment/Re-Assessment sessions the following treatments were rendered)    Subjective Pre-Treatment Symptoms: Reports that he is doing okay. Reports pain at about 5-6 steps but is able to manage it. Therapeutic Exercise: (30 min) Done in order to improve strength, ROM and understanding of current condition.     Date  1-23-19 Date  1-28-19   Activity/Exercise     Education Discussed HEP Discussed HEP   Biking 10 min warm up 10 min warm up   Taping · Taped cuboid into pronation, used 2 I strips of kinesio tape  · Taped patella into distraction using kinesio tape · Taped cuboid into pronation, used 2 I strips of kinesio tape  · Taped patella into distraction using kinesio tape   ankle eversion HEP    Gait training     Terminal knee extension w/ step up 6\" , 10x, 3 sets 6\", 10x, 3 sets   Ankle proprioception Step over, unstable surface, 10x, 2 sets Step over, unstable surface, 10x, 2 sets   Calf stretch 60 sec, 2x 60 sec, 3x   Quad stretch 60 sec 60 sec, 2x   Heel raises 10x, 2 sets, SL 10x, 2 sets   Side-lying hip abduction   12x, 3 sets   Manual Therapy: (25 min) Done in order to improve joint and soft tissue mobility,reduce muscle guarding, and decrease muscle tone  · Patella mobilization, superior-inferior, medial-lateral and distraction using suction done at 0, 15, 45 and 90 deg flex  · Talocrural posterior-medial glide to facilitated DF, in supine   · Calcaneal distraction with DF of the ankle followed by eversion   · Midfoot mobilization while keeping cuboid stable   · Cuboid whip manipulation in prone, grade V (not done today)  · Mobilize medial  Gastroc and soleus, done with active DF/PF (not done today)  Modalities: ( 10 min) Done in order to reduce swelling and pain  Ice x 10 min  Treatment/Session Assessment: Mr. Vinay Nicholson tolerated the session well. Demonstrates good functional ability to do a 6\" step up for 10x, 3 sets. Pain seems much more manageable. · Pain: Initial: no pain at rest reported Post Session:  Minor soreness after session ·   Compliance with Program/Exercises: compliant   · Recommendations/Intent for next treatment session: \"Next visit will focus on progressing the patients plan of care\".     Future Appointments   Date Time Provider Alan Plunkett   1/30/2019 10:45 AM Marty Severe, PT, DPT [facility]   2/4/2019 10:00 AM Marty Severe, PT, DPT [facility]   2/6/2019 10:00 AM Marty Severe, PT, DPT [facility]   2/11/2019 10:00 AM Marty Severe, PT, DPT [facility]   2/13/2019 10:00 AM Marty Severe, PT, DPT [facility]     Total Treatment Duration:65 min  PT Patient Time In/Time Out  Time In: 1005  Time Out: 202 Fred Ramirez PT, DPT

## 2019-01-30 ENCOUNTER — HOSPITAL ENCOUNTER (OUTPATIENT)
Dept: PHYSICAL THERAPY | Age: 54
Discharge: HOME | End: 2019-01-30
Payer: COMMERCIAL

## 2019-01-30 PROCEDURE — 97140 MANUAL THERAPY 1/> REGIONS: CPT

## 2019-01-30 PROCEDURE — 97110 THERAPEUTIC EXERCISES: CPT

## 2019-01-30 NOTE — PROGRESS NOTES
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 5 = High Risk)  MD Orders: evaluate and treat MEDICAL/REFERRING DIAGNOSIS:Right ankle sprain,chondromalacia right knee  DATE OF ONSET:   REFERRING PHYSICIAN:Nicholas Deluna PA  RETURN PHYSICIAN APPOINTMENT: did not specify       ASSESSMENT:  Mr. Bud Wolf has come for a total of 29  visits since starting physical therapy on 18. During that time he reports a 75% overall improvement. Objectively he has full knee and foot ROM. His patella-femoral joint still has tightness but better overall and he is tolerating doing step ups on a 6\" step which is a good improvement. He still has slight cuboid and peroneal tendon pain but that is also much better overall. Therapy should start to slowly be tapered off and a return to work program should be implemented. Thank you. PROBLEM LIST (Impacting functional limitations):  1. Decreased Strength  2. Decreased ADL/Functional Activities  3. Increased Pain  4. Decreased Activity Tolerance  5. Decreased Flexibility/Joint Mobility  6. Decreased [place] with Home Exercise Program INTERVENTIONS PLANNED:  1. Cold  2. Heat  3. Home Exercise Program (HEP)  4. Manual Therapy  5. Range of Motion (ROM)  6. Therapeutic Exercise/Strengthening     TREATMENT PLAN:  Effective Dates:  19 TO 3-14-19  Frequency/Duration: 2 times a week for 4-8 wks  GOALS: (Goals have been discussed and agreed upon with patient.)  SHORT-TERM FUNCTIONAL GOALS: Time Frame: 2-4 wks  1. Patient will report 25-50% reduction in overall symptoms (MET)  2.   Patient will be compliant with HEP and plan of care (MET)  3. Patient will have 0-125 deg ROM of the knee (MET)   4. Patient will improve swelling by 1 cm on the figure 8 measurement  (MET)  5. Patient will improve on the LEFS by 5-7 points (MET)  DISCHARGE GOALS: Time Frame: 10-12 wk  1. Patient will report % reduction in overall symptoms in order to be able to have full function with decreased symptoms (MET)  2. Patient will be able to squat with minimal pain as needed at his job with minimal pain (0-2/10) (able to do a partial squat but not deep squats)  3. Patient will be able to ambulate with a symmetrical gait pattern as judged by therapist in order to able to perform activities of daily living (MET)  4. Patient will score 60 or greater on the LEFS points in order to demonstrate improved function with less pain (ongoing)     Rehabilitation Potential For Stated Goals: Good    Regarding Jaswant Pinna therapy, I certify that the treatment plan above will be carried out by a therapist or under their direction. Thank you for this referral,  Jaswant Ford, PT, DPT               HISTORY:    History of Present Injury/Illness (Reason for Referral): Reports that he fell at work on 8-1-18. He had puncture wounds in his lower leg. He had a lot of bleeding during that time requiring stiches in one of the wounds. He had an MRI on his knee revealed a meniscus tear. He went to the medical facility at [address] [place] [address] and they sent him to the ED where he got stiches for his wounds. The next day he went to the [facility] well doctor and was then referred to Dr. Tonya Ortiz. He also had hand tingling afterwards. He was put on a blood thinner to prevent blood clots after having a negative ultrasound. He had a steroid shot in the knee with minimal benefit at this point. No numbness in the foot reported. 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 2 stents, diabetes type II   Social History/Living Environment: lives in a house with family  Prior Level of Function/Work/Activity:independent / works at [employer]  Current Medications:   Aspirin, losartan, clopidogrel, omprezole, atovastatin, metoprolol, metformin, claratan   Date Last Reviewed:  1/30/2019    Number of Personal Factors/Comorbidities that affect the Plan of Care: 0: LOW COMPLEXITY    EXAMINATION:    (Abbreviations: P-pain, NP- no pain, wnl-within normal limits)    Observation/Orthostatic Postural Assessment:    Relaxed standing posture:  -     Palpation/tone/tissue texture:    ASIS: n/a  PSIS:n/a  Leg Length: supine:n/a         Long Sitting: n/a      Patella femoral joint: R increased tightness compared to left but much improved from initial  Soft tissue:  · Hip flexors:mild tightness in R quad  · Hamstrings: increased tightness on R  · IT band: na  · gastroc/soleus/posterior tibialis: increased tightness on R (improving but still tight)  · Anterior tibialis: Increased tightness on R   (improved)    ROM:   Knee ROM Date:  1-14-19       Right Left   Flexion 138 140   Extension 0 deg  0      Ankle ROM Date:  1-14-19       Right Left   DF 10  (increased talocrural talocrural hypomobility)  15   PF wnl wnl   Inversion wnl wnl   Eversion wnl wnll           Strength:     Hip Strength Date:  11-19-18       Right Left   Flexion 5 5   Extension 5 5   IR n/a n/a   ER n/a n/a   Abduction 5 5      Knee Strength Date:  1-14-19       Right Left   Flexion 5 5   Extension 4+ 5             Special Tests:   Stability tests:  Lachmans: (-)  Slocums:  AM: (-)          AL: (-)  Posterior drawer: (-)  Posterior Sag: (-)    Varus: 0 deg: n/a today   30 deg: n/a today  Valgus: 0 deg: n/a today   30 deg: n/a today    Meniscus cluster test  1. End range flexion: -  2. End range extension: (-)  3. Joint line tenderness: medical joint line tenderness  (mild)  4. Report of locking: (-)  5.  McMurreys: n/a    Neurological Screen:   Myotomes: strong in bilateral LE's     Dermatomes: intact in bilateral LE's Reflexes: n/a         Neural Tension Tests: -  Functional Mobility:    · Double leg squat: able to do a functional squat with minimal pain but does have increased pain with more repetitions   · Single leg squat:  L:functional        R: able to do a 6\" step up with minimal pain  · Gait Pattern: ambulates with decreased terminal stance on R  (improved)  Balance:  Single leg   L: 10 sec, minimal sway  R: 10 sec, minimal sway         CLINICAL DECISION MAKING:    Outcome Measure: Tool Used: Lower Extremity Functional Scale (LEFS)  Score:  Initial: 27/80 43/80 (Date: 9-26-18 ) 36/80 (Date 11-19-18) Most Recent: 43/80  (Date 1-14-19)   Interpretation of Score: 20 questions each scored on a 5 point scale with 0 representing \"extreme difficulty or unable to perform\" and 4 representing \"no difficulty\". The lower the score, the greater the functional disability. 80/80 represents no disability. Minimal detectable change is 9 points. Score 80 79-63 62-48 47-32 31-16 15-1 0   Modifier CH CI CJ CK CL CM CN     Medical Necessity:   · Patient is expected to demonstrate progress in strength, range of motion and symptom levels to return to full function. Reason for Services/Other Comments:  · Patient continues to require skilled intervention due to ongoing symptoms. ·    TREATMENT:    (In addition to Assessment/Re-Assessment sessions the following treatments were rendered)    Subjective Pre-Treatment Symptoms: Reports that he is doing pretty good overall but no major changes since Monday. Therapeutic Exercise: (30 min) Done in order to improve strength, ROM and understanding of current condition.     Date  1-28-19 Date  1-30-19   Activity/Exercise     Education Discussed HEP Discussed HEP   Biking 10 min warm up 10 min   Taping · Taped cuboid into pronation, used 2 I strips of kinesio tape  · Taped patella into distraction using kinesio tape · Taped cuboid into pronation, used 2 I strips of kinesio tape  · Taped patella into distraction using kinesio tap   ankle eversion     Gait training     Terminal knee extension w/ step up 6\", 10x, 3 sets 6\", 12x, 3 sets, bilateral   Ankle proprioception Step over, unstable surface, 10x, 2 sets Step over, unstable surface, 10x, 2 sets  SL balance with anterior-posterior weight shift   Calf stretch 60 sec, 3x 60 sec, 3x   Quad stretch 60 sec, 2x 60 sec, 2x   Heel raises 10x, 2 sets 10x, 2 sets, SL   Side-lying hip abduction  12x, 3 sets 10x, 3 sets   Manual Therapy: (25 min) Done in order to improve joint and soft tissue mobility,reduce muscle guarding, and decrease muscle tone  · Patella mobilization, superior-inferior, medial-lateral and distraction using suction done at 0, 15, 45 and 90 deg flex  · Talocrural posterior-medial glide to facilitated DF, in supine   · Calcaneal distraction with DF of the ankle followed by eversion   · Midfoot mobilization while keeping cuboid stable   · Cuboid whip manipulation in prone, grade V (not done today)  · Mobilize medial  Gastroc and soleus, done with active DF/PF (not done today)  Modalities: ( min) Done in order to reduce swelling and pain    Treatment/Session Assessment: Mr. Lupe Reich tolerated the session well. Continues to do pretty good with the 6\" step. We will attempt to raise it next week. Patella mobility is doing relatively well at start and end of session. · Pain: Initial: no pain at rest reported Post Session:  Minor soreness after session ·   Compliance with Program/Exercises: compliant   · Recommendations/Intent for next treatment session: \"Next visit will focus on progressing the patients plan of care\".     Future Appointments   Date Time Provider Alan Plunkett   2/4/2019 10:00 AM [facility]   2/6/2019 10:00 AM Camilla Loco PT, DPT [facility]   2/11/2019 10:00 AM Camilla Loco PT, DPT [facility]   2/13/2019 10:00 AM Camilla Loco PT, DPT [facility]     Total Treatment Duration:55 min  PT Patient Time In/Time Out  Time In: 1045  Time Out: 1538 minniewa  PT, DPT

## 2019-02-04 ENCOUNTER — HOSPITAL ENCOUNTER (OUTPATIENT)
Dept: PHYSICAL THERAPY | Age: 54
Discharge: HOME | End: 2019-02-04
Payer: COMMERCIAL

## 2019-02-04 PROCEDURE — 97110 THERAPEUTIC EXERCISES: CPT

## 2019-02-04 PROCEDURE — 97140 MANUAL THERAPY 1/> REGIONS: CPT

## 2019-02-04 NOTE — PROGRESS NOTES
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 5 = High Risk)  MD Orders: evaluate and treat MEDICAL/REFERRING DIAGNOSIS:Right ankle sprain,chondromalacia right knee  DATE OF ONSET:   REFERRING PHYSICIAN:Emilee Deluna Cera, PA  RETURN PHYSICIAN APPOINTMENT: did not specify       ASSESSMENT:  Mr. Melissa Green has come for a total of 29  visits since starting physical therapy on 18. During that time he reports a 75% overall improvement. Objectively he has full knee and foot ROM. His patella-femoral joint still has tightness but better overall and he is tolerating doing step ups on a 6\" step which is a good improvement. He still has slight cuboid and peroneal tendon pain but that is also much better overall. Therapy should start to slowly be tapered off and a return to work program should be implemented. Thank you. PROBLEM LIST (Impacting functional limitations):  1. Decreased Strength  2. Decreased ADL/Functional Activities  3. Increased Pain  4. Decreased Activity Tolerance  5. Decreased Flexibility/Joint Mobility  6. Decreased [place] with Home Exercise Program INTERVENTIONS PLANNED:  1. Cold  2. Heat  3. Home Exercise Program (HEP)  4. Manual Therapy  5. Range of Motion (ROM)  6. Therapeutic Exercise/Strengthening     TREATMENT PLAN:  Effective Dates:  19 TO 3-14-19  Frequency/Duration: 2 times a week for 4-8 wks  GOALS: (Goals have been discussed and agreed upon with patient.)  SHORT-TERM FUNCTIONAL GOALS: Time Frame: 2-4 wks  1. Patient will report 25-50% reduction in overall symptoms (MET)  2.   Patient will be compliant with HEP and plan of care (MET)  3. Patient will have 0-125 deg ROM of the knee (MET)   4. Patient will improve swelling by 1 cm on the figure 8 measurement  (MET)  5. Patient will improve on the LEFS by 5-7 points (MET)  DISCHARGE GOALS: Time Frame: 10-12 wk  1. Patient will report % reduction in overall symptoms in order to be able to have full function with decreased symptoms (MET)  2. Patient will be able to squat with minimal pain as needed at his job with minimal pain (0-2/10) (able to do a partial squat but not deep squats)  3. Patient will be able to ambulate with a symmetrical gait pattern as judged by therapist in order to able to perform activities of daily living (MET)  4. Patient will score 60 or greater on the LEFS points in order to demonstrate improved function with less pain (ongoing)     Rehabilitation Potential For Stated Goals: Good    Regarding Florecita Grayrobert therapy, I certify that the treatment plan above will be carried out by a therapist or under their direction. Thank you for this referral,  Mazin Sena PT, DPT               HISTORY:    History of Present Injury/Illness (Reason for Referral): Reports that he fell at work on 8-1-18. He had puncture wounds in his lower leg. He had a lot of bleeding during that time requiring stiches in one of the wounds. He had an MRI on his knee revealed a meniscus tear. He went to the medical facility at [address] [place] [address] and they sent him to the ED where he got stiches for his wounds. The next day he went to the work well doctor and was then referred to Dr. Robyn Burt. He also had hand tingling afterwards. He was put on a blood thinner to prevent blood clots after having a negative ultrasound. He had a steroid shot in the knee with minimal benefit at this point. No numbness in the foot reported. 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 2 stents, diabetes type II   Social History/Living Environment: lives in a house with family  Prior Level of Function/Work/Activity:independent / works at [employer]  Current Medications:   Aspirin, losartan, clopidogrel, omprezole, atovastatin, metoprolol, metformin, claratan   Date Last Reviewed:  2/4/2019    Number of Personal Factors/Comorbidities that affect the Plan of Care: 0: LOW COMPLEXITY    EXAMINATION:    (Abbreviations: P-pain, NP- no pain, wnl-within normal limits)    Observation/Orthostatic Postural Assessment:    Relaxed standing posture:  -     Palpation/tone/tissue texture:    ASIS: n/a  PSIS:n/a  Leg Length: supine:n/a         Long Sitting: n/a      Patella femoral joint: R increased tightness compared to left but much improved from initial  Soft tissue:  · Hip flexors:mild tightness in R quad  · Hamstrings: increased tightness on R  · IT band: na  · gastroc/soleus/posterior tibialis: increased tightness on R (improving but still tight)  · Anterior tibialis: Increased tightness on R   (improved)    ROM:   Knee ROM Date:  1-14-19       Right Left   Flexion 138 140   Extension 0 deg  0      Ankle ROM Date:  1-14-19       Right Left   DF 10  (increased talocrural talocrural hypomobility)  15   PF wnl wnl   Inversion wnl wnl   Eversion wnl wnll           Strength:     Hip Strength Date:  11-19-18       Right Left   Flexion 5 5   Extension 5 5   IR n/a n/a   ER n/a n/a   Abduction 5 5      Knee Strength Date:  1-14-19       Right Left   Flexion 5 5   Extension 4+ 5             Special Tests:   Stability tests:  Lachmans: (-)  Slocums:  AM: (-)          AL: (-)  Posterior drawer: (-)  Posterior Sag: (-)    Varus: 0 deg: n/a today   30 deg: n/a today  Valgus: 0 deg: n/a today   30 deg: n/a today    Meniscus cluster test  1. End range flexion: -  2. End range extension: (-)  3. Joint line tenderness: medical joint line tenderness  (mild)  4. Report of locking: (-)  5.  McMurreys: n/a    Neurological Screen:   Myotomes: strong in bilateral LE's     Dermatomes: intact in bilateral LE's Reflexes: n/a         Neural Tension Tests: -  Functional Mobility:    · Double leg squat: able to do a functional squat with minimal pain but does have increased pain with more repetitions   · Single leg squat:  L:functional        R: able to do a 6\" step up with minimal pain  · Gait Pattern: ambulates with decreased terminal stance on R  (improved)  Balance:  Single leg   L: 10 sec, minimal sway  R: 10 sec, minimal sway         CLINICAL DECISION MAKING:    Outcome Measure: Tool Used: Lower Extremity Functional Scale (LEFS)  Score:  Initial: 27/80 43/80 (Date: 9-26-18 ) 36/80 (Date 11-19-18) Most Recent: 43/80  (Date 1-14-19)   Interpretation of Score: 20 questions each scored on a 5 point scale with 0 representing \"extreme difficulty or unable to perform\" and 4 representing \"no difficulty\". The lower the score, the greater the functional disability. 80/80 represents no disability. Minimal detectable change is 9 points. Score 80 79-63 62-48 47-32 31-16 15-1 0   Modifier CH CI CJ CK CL CM CN     Medical Necessity:   · Patient is expected to demonstrate progress in strength, range of motion and symptom levels to return to full function. Reason for Services/Other Comments:  · Patient continues to require skilled intervention due to ongoing symptoms. ·    TREATMENT:    (In addition to Assessment/Re-Assessment sessions the following treatments were rendered)    Subjective Pre-Treatment Symptoms: Reports that he is a little under the weather. Pain is manageable. Therapeutic Exercise: (30 min) Done in order to improve strength, ROM and understanding of current condition.     Date  1-30-19 Date  2-4-19   Activity/Exercise     Education Discussed HEP Discussed HEP   Biking 10 min 10 min   Taping · Taped cuboid into pronation, used 2 I strips of kinesio tape  · Taped patella into distraction using kinesio tape · Taped cuboid into pronation, used 2 I strips of kinesio tape  · Taped patella into distraction using kinesio tape   ankle eversion  Isometric hold, 10 sec, 10x   Gait training     Terminal knee extension w/ step up 6\", 12x, 3 sets, bilateral 6\", 12x, 3 sets, bilateral  (attempted 8\" but it was painful)   Ankle proprioception Step over, unstable surface, 10x, 2 sets  SL balance with anterior-posterior weight shift Step over, unstable surface, 10x, 2 sets  SL balance with anterior-posterior weight shift   Calf stretch 60 sec, 3x 60 sec, 3x   Quad stretch 60 sec, 2x 60 sec, 2 sets   Heel raises 10x, 2 sets, SL    Side-lying hip abduction  10x, 3 sets 10x, 3 sets   Manual Therapy: (20 min) Done in order to improve joint and soft tissue mobility,reduce muscle guarding, and decrease muscle tone  · Patella mobilization, superior-inferior, medial-lateral and distraction using suction done at 0, 15, 45 and 90 deg flex  · Talocrural posterior-medial glide to facilitated DF, in supine   · Calcaneal distraction with DF of the ankle followed by eversion   · Midfoot mobilization while keeping cuboid stable   · Cuboid whip manipulation in prone, grade V (not done today)  · Mobilize medial  Gastroc and soleus, done with active DF/PF  Modalities: (10  min) Done in order to reduce swelling and pain  Ice x 10 min  Treatment/Session Assessment: Mr. Whitney Bailey tolerated the session well. Progressing well overall with ROM. Still unable to tolerate 8\" box. Discussed home program   · Pain: Initial: no pain at rest reported Post Session:  Minor soreness after session ·   Compliance with Program/Exercises: compliant   · Recommendations/Intent for next treatment session: \"Next visit will focus on progressing the patients plan of care\".     Future Appointments   Date Time Provider Alan Plunkett   2/6/2019 10:00 AM Andrea Santos PT, [facility]   2/11/2019 10:00 AM Andrea Santos PT, DPT [facility]   2/13/2019  4:15 PM R[facility]ERTO Jaramillo PT     Total Treatment Duration:65 min  PT Patient Time In/Time Out  Time In: 1005  Time Out: 202 Fred Ramirez PT, DPT

## 2019-02-06 ENCOUNTER — HOSPITAL ENCOUNTER (OUTPATIENT)
Dept: PHYSICAL THERAPY | Age: 54
Discharge: HOME | End: 2019-02-06
Payer: COMMERCIAL

## 2019-02-06 PROCEDURE — 97140 MANUAL THERAPY 1/> REGIONS: CPT

## 2019-02-06 PROCEDURE — 97110 THERAPEUTIC EXERCISES: CPT

## 2019-02-06 NOTE — PROGRESS NOTES
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 5 = High Risk)  MD Orders: evaluate and treat MEDICAL/REFERRING DIAGNOSIS:Right ankle sprain,chondromalacia right knee  DATE OF ONSET:   REFERRING PHYSICIAN:Selwyn Deluna PA  RETURN PHYSICIAN APPOINTMENT: did not specify       ASSESSMENT:  Mr. Geremias Diaz has come for a total of 29  visits since starting physical therapy on 18. During that time he reports a 75% overall improvement. Objectively he has full knee and foot ROM. His patella-femoral joint still has tightness but better overall and he is tolerating doing step ups on a 6\" step which is a good improvement. He still has slight cuboid and peroneal tendon pain but that is also much better overall. Therapy should start to slowly be tapered off and a return to work program should be implemented. Thank you. PROBLEM LIST (Impacting functional limitations):  1. Decreased Strength  2. Decreased ADL/Functional Activities  3. Increased Pain  4. Decreased Activity Tolerance  5. Decreased Flexibility/Joint Mobility  6. Decreased [place] with Home Exercise Program INTERVENTIONS PLANNED:  1. Cold  2. Heat  3. Home Exercise Program (HEP)  4. Manual Therapy  5. Range of Motion (ROM)  6. Therapeutic Exercise/Strengthening     TREATMENT PLAN:  Effective Dates:  19 TO 3-14-19  Frequency/Duration: 2 times a week for 4-8 wks  GOALS: (Goals have been discussed and agreed upon with patient.)  SHORT-TERM FUNCTIONAL GOALS: Time Frame: 2-4 wks  1. Patient will report 25-50% reduction in overall symptoms (MET)  2.   Patient will be compliant with HEP and plan of care (MET)  3. Patient will have 0-125 deg ROM of the knee (MET)   4. Patient will improve swelling by 1 cm on the figure 8 measurement  (MET)  5. Patient will improve on the LEFS by 5-7 points (MET)  DISCHARGE GOALS: Time Frame: 10-12 wk  1. Patient will report % reduction in overall symptoms in order to be able to have full function with decreased symptoms (MET)  2. Patient will be able to squat with minimal pain as needed at his job with minimal pain (0-2/10) (able to do a partial squat but not deep squats)  3. Patient will be able to ambulate with a symmetrical gait pattern as judged by therapist in order to able to perform activities of daily living (MET)  4. Patient will score 60 or greater on the LEFS points in order to demonstrate improved function with less pain (ongoing)     Rehabilitation Potential For Stated Goals: Good    Regarding [facility] therapy, I certify that the treatment plan above will be carried out by a therapist or under their direction. Thank you for this referral,  Sara Dhaliwal, PT, DPT               HISTORY:    History of Present Injury/Illness (Reason for Referral): Reports that he fell at work on 8-1-18. He had puncture wounds in his lower leg. He had a lot of bleeding during that time requiring stiches in one of the wounds. He had an MRI on his knee revealed a meniscus tear. He went to the medical facility at [address] [place] [address] and they sent him to the ED where he got stiches for his wounds. The next day he went to the work well doctor and was then referred to Dr. Shawn Castle. He also had hand tingling afterwards. He was put on a blood thinner to prevent blood clots after having a negative ultrasound. He had a steroid shot in the knee with minimal benefit at this point. No numbness in the foot reported. 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 2 stents, diabetes type II   Social History/Living Environment: lives in a house with family  Prior Level of Function/Work/Activity:independent / works at [employer]  Current Medications:   Aspirin, losartan, clopidogrel, omprezole, atovastatin, metoprolol, metformin, claratan   Date Last Reviewed:  2/6/2019    Number of Personal Factors/Comorbidities that affect the Plan of Care: 0: LOW COMPLEXITY    EXAMINATION:    (Abbreviations: P-pain, NP- no pain, wnl-within normal limits)    Observation/Orthostatic Postural Assessment:    Relaxed standing posture:  -     Palpation/tone/tissue texture:    ASIS: n/a  PSIS:n/a  Leg Length: supine:n/a         Long Sitting: n/a      Patella femoral joint: R increased tightness compared to left but much improved from initial  Soft tissue:  · Hip flexors:mild tightness in R quad  · Hamstrings: increased tightness on R  · IT band: na  · gastroc/soleus/posterior tibialis: increased tightness on R (improving but still tight)  · Anterior tibialis: Increased tightness on R   (improved)    ROM:   Knee ROM Date:  1-14-19       Right Left   Flexion 138 140   Extension 0 deg  0      Ankle ROM Date:  1-14-19       Right Left   DF 10  (increased talocrural talocrural hypomobility)  15   PF wnl wnl   Inversion wnl wnl   Eversion wnl wnll           Strength:     Hip Strength Date:  11-19-18       Right Left   Flexion 5 5   Extension 5 5   IR n/a n/a   ER n/a n/a   Abduction 5 5      Knee Strength Date:  1-14-19       Right Left   Flexion 5 5   Extension 4+ 5             Special Tests:   Stability tests:  Lachmans: (-)  Slocums:  AM: (-)          AL: (-)  Posterior drawer: (-)  Posterior Sag: (-)    Varus: 0 deg: n/a today   30 deg: n/a today  Valgus: 0 deg: n/a today   30 deg: n/a today    Meniscus cluster test  1. End range flexion: -  2. End range extension: (-)  3. Joint line tenderness: medical joint line tenderness  (mild)  4. Report of locking: (-)  5.  McMurreys: n/a    Neurological Screen:   Myotomes: strong in bilateral LE's     Dermatomes: intact in bilateral LE's Reflexes: n/a         Neural Tension Tests: -  Functional Mobility:    · Double leg squat: able to do a functional squat with minimal pain but does have increased pain with more repetitions   · Single leg squat:  L:functional        R: able to do a 6\" step up with minimal pain  · Gait Pattern: ambulates with decreased terminal stance on R  (improved)  Balance:  Single leg   L: 10 sec, minimal sway  R: 10 sec, minimal sway         CLINICAL DECISION MAKING:    Outcome Measure: Tool Used: Lower Extremity Functional Scale (LEFS)  Score:  Initial: 27/80 43/80 (Date: 9-26-18 ) 36/80 (Date 11-19-18) Most Recent: 43/80  (Date 1-14-19)   Interpretation of Score: 20 questions each scored on a 5 point scale with 0 representing \"extreme difficulty or unable to perform\" and 4 representing \"no difficulty\". The lower the score, the greater the functional disability. 80/80 represents no disability. Minimal detectable change is 9 points. Score 80 79-63 62-48 47-32 31-16 15-1 0   Modifier CH CI CJ CK CL CM CN     Medical Necessity:   · Patient is expected to demonstrate progress in strength, range of motion and symptom levels to return to full function. Reason for Services/Other Comments:  · Patient continues to require skilled intervention due to ongoing symptoms. ·    TREATMENT:    (In addition to Assessment/Re-Assessment sessions the following treatments were rendered)    Subjective Pre-Treatment Symptoms: Reports that he is a little sore. States he worked out in the yard yesterday. Therapeutic Exercise: (30 min) Done in order to improve strength, ROM and understanding of current condition.     Date  2-4-19 Date  2-6-19   Activity/Exercise     Education Discussed HEP Discussed HEP   Biking 10 min 10 min for warm up   Taping · Taped cuboid into pronation, used 2 I strips of kinesio tape  · Taped patella into distraction using kinesio tape · Taped cuboid into pronation, used 2 I strips of kinesio tape  · Taped patella into distraction using kinesio tape   ankle eversion Isometric hold, 10 sec, 10x 10x, 2 sets, manual resistance   Gait training     Terminal knee extension w/ step up 6\", 12x, 3 sets, bilateral  (attempted 8\" but it was painful) 6\", 10x, 3 sets   Ankle proprioception Step over, unstable surface, 10x, 2 sets  SL balance with anterior-posterior weight shift Step over, unstable surface, 10x, 2 sets  SL balance with anterior-posterior weight shift   Calf stretch 60 sec, 3x 60 sec, 3x   Quad stretch 60 sec, 2 sets 60 sec   Heel raises  10x, 2 sets, SL    Side-lying hip abduction  10x, 3 sets -   Manual Therapy: (20 min) Done in order to improve joint and soft tissue mobility,reduce muscle guarding, and decrease muscle tone  · Patella mobilization, superior-inferior, medial-lateral and distraction using suction done at 0, 15, 45 and 90 deg flex  · Talocrural posterior-medial glide to facilitated DF, in supine   · Calcaneal distraction with DF of the ankle followed by eversion   · Midfoot mobilization while keeping cuboid stable   · Cuboid whip manipulation in prone, grade V (not done today)  · Mobilize medial  Gastroc and soleus, done with active DF/PF (not done today)  Modalities: (10  min) Done in order to reduce swelling and pain  Ice x 10 min  Treatment/Session Assessment: Mr. Lydia Yoder tolerated the session well. He is doing relatively well with the 6\" step up box but cant go higher than that without pain. Patella mobility is good when testing in non weight bearing position. Talocrural posterior mobility has also improved. Showed him how to mobilize his own patella using a plunger as well how to properly tape. · Pain: Initial: mild knee soreness at rest on medial side Post Session: no pain reported ·   Compliance with Program/Exercises: compliant   · Recommendations/Intent for next treatment session: \"Next visit will focus on progressing the patients plan of care\".     Future Appointments   Date Time Provider Department [place]   2/13/2019  4:15 PM Darius Bucio, PT, DPT [facility]   2/15/2019  1:45 PM [facility]     Total Treatment Duration:60 min  PT Patient Time In/Time Out  Time In: 1000  Time Out: [address] Svetlana PT, DPT

## 2019-02-11 ENCOUNTER — APPOINTMENT (OUTPATIENT)
Dept: PHYSICAL THERAPY | Age: 54
End: 2019-02-11
Payer: COMMERCIAL

## 2019-02-13 ENCOUNTER — HOSPITAL ENCOUNTER (OUTPATIENT)
Dept: PHYSICAL THERAPY | Age: 54
Discharge: HOME | End: 2019-02-13
Payer: COMMERCIAL

## 2019-02-13 PROCEDURE — 97140 MANUAL THERAPY 1/> REGIONS: CPT

## 2019-02-13 PROCEDURE — 97110 THERAPEUTIC EXERCISES: CPT

## 2019-02-13 NOTE — THERAPY DISCHARGE
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 5 = High Risk)  MD Orders: evaluate and treat MEDICAL/REFERRING DIAGNOSIS:Right ankle sprain,chondromalacia right knee  DATE OF ONSET:   REFERRING PHYSICIAN:Sachin Deluna PA  RETURN PHYSICIAN APPOINTMENT: did not specify       ASSESSMENT:  Mr. Stanley Leblanc has come for a total of 36  visits since starting physical therapy on 18. During that time he reports about a 75% overall improvement; however, in the last several weeks we have begun making slower improvements. His patella-femoral joint has much improved mobility and he can tolerate a step up on a 6\" step but nothing much higher due to excessive patella compression forces. Taping helps keep slight distraction of the joint which he is able to do on his own. He was also educated on how to self mobilize his patella. His ankle motion and strength are doing well, he still has slight lateral talocrural laxity so he was educated to continue working on neuromuscular training, taping the joint, and self medial mobilization of the talus. At this time he will be discharged since he can manage his exercises on his own. We discussed following up if he has a procedure on his knee or ankle requiring further therapy. . Thank you. GOALS: (Goals have been discussed and agreed upon with patient.)  SHORT-TERM FUNCTIONAL GOALS: Time Frame: 2-4 wks  1. Patient will report 25-50% reduction in overall symptoms (MET)  2. Patient will be compliant with HEP and plan of care (MET)  3. Patient will have 0-125 deg ROM of the knee (MET)   4.  Patient will improve swelling by 1 cm on the figure 8 measurement  (MET)  5. Patient will improve on the LEFS by 5-7 points (MET)  DISCHARGE GOALS: Time Frame: 10-12 wk  1. Patient will report % reduction in overall symptoms in order to be able to have full function with decreased symptoms (MET)  2. Patient will be able to squat with minimal pain as needed at his job with minimal pain (0-2/10) (able to do a partial squat but not deep squats)  3. Patient will be able to ambulate with a symmetrical gait pattern as judged by therapist in order to able to perform activities of daily living (MET)  4. Patient will score 60 or greater on the LEFS points in order to demonstrate improved function with less pain (NOT MET)    Regarding [facility] therapy, I certify that the treatment plan above will be carried out by a therapist or under their direction. Thank you for this referral,  Leander Gregg, PT, DPT               HISTORY:    History of Present Injury/Illness (Reason for Referral): Reports that he fell at work on 8-1-18. He had puncture wounds in his lower leg. He had a lot of bleeding during that time requiring stiches in one of the wounds. He had an MRI on his knee revealed a meniscus tear. He went to the medical facility at [address] [place] [address] [facility] and they sent him to the ED where he got stiches for his wounds. The next day he went to the work well doctor and was then referred to Dr. Max Shah. He also had hand tingling afterwards. He was put on a blood thinner to prevent blood clots after having a negative ultrasound. He had a steroid shot in the knee with minimal benefit at this point. No numbness in the foot reported. 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 2 stents, diabetes type II   Social History/Living Environment: lives in a house with family  Prior Level of Function/Work/Activity:independent / works at [address] [place] [address] [employer]  Current Medications:   Aspirin, losartan, clopidogrel, omprezole, atovastatin, metoprolol, metformin, claratan     EXAMINATION:    (Abbreviations: P-pain, NP- no pain, wnl-within normal limits)    Observation/Orthostatic Postural Assessment:    Relaxed standing posture:  R foot inverted    Palpation/tone/tissue texture:    ASIS: n/a  PSIS:n/a  Leg Length: supine:n/a         Long Sitting: n/a      Patella femoral joint: R increased tightness compared to left but much improved from initial  Soft tissue:  · Hip flexors:mild tightness in R quad  · Hamstrings: increased tightness on R  · IT band: na  · gastroc/soleus/posterior tibialis: increased tightness on R (improving but still tight)  · Anterior tibialis: Increased tightness on R   (improved)    ROM:   Knee ROM Date:  2-13-19       Right Left   Flexion 138 140   Extension 0 deg  0      Ankle ROM Date:  2-13-19       Right Left   DF 15  15   PF wnl wnl   Inversion wnl wnl   Eversion wnl wnll           Strength:     Hip Strength Date:  2-13-19       Right Left   Flexion 5 5   Extension 5 5   IR n/a n/a   ER n/a n/a   Abduction 5 5      Knee Strength Date:  2-13-19       Right Left   Flexion 5 5   Extension 5 5             Special Tests: Ankle:  · Anterior drawer: stable on R  · Calcaneal tilt: stable, increased lateral laxity on R side    Stability tests:  Lachmans: (-)  Slocums:  AM: (-)          AL: (-)  Posterior drawer: (-)  Posterior Sag: (-)    Varus: 0 deg: n/a today   30 deg: n/a today  Valgus: 0 deg: n/a today   30 deg: n/a today    Meniscus cluster test  1. End range flexion: -  2. End range extension: (-)  3. Joint line tenderness: medical joint line tenderness  (mild)  4. Report of locking: (-)  5.  McMurreys: n/a      Neurological Screen:   Myotomes: strong in bilateral LE's     Dermatomes: intact in bilateral LE's         Reflexes: n/a         Neural Tension Tests: -  Functional Mobility:    · Double leg squat: able to do a functional squat with minimal pain but does have increased pain with more repetitions   · Single leg squat:  L:functional        R: able to do a 6\" step up with minimal pain  · Gait Pattern: ambulates with decreased terminal stance on R  (improved)  Balance:  Single leg   L: 10 sec, minimal sway  R: 10 sec, minimal sway         CLINICAL DECISION MAKING:    Outcome Measure: Tool Used: Lower Extremity Functional Scale (LEFS)  Score:  Initial: 27/80 43/80 (Date: 9-26-18 ) 36/80 (Date 11-19-18)  43/80  (Date 1-14-19) Most Recent: 32/80  (Date: 2-13-19)   Interpretation of Score: 20 questions each scored on a 5 point scale with 0 representing \"extreme difficulty or unable to perform\" and 4 representing \"no difficulty\". The lower the score, the greater the functional disability. 80/80 represents no disability. Minimal detectable change is 9 points. Score 80 79-63 62-48 47-32 31-16 15-1 0   Modifier CH CI CJ CK CL CM CN      ·    TREATMENT:    (In addition to Assessment/Re-Assessment sessions the following treatments were rendered)    Subjective Pre-Treatment Symptoms: Reports that he saw the doctor who recommended to either do surgery or do the hyaluronic acid injections for the knee. Therapeutic Exercise: (15 min) Done in order to improve strength, ROM and understanding of current condition.     Date  2-4-19 Date  2-6-19 Date  2-13-19   Activity/Exercise      Education Discussed HEP Discussed HEP Discussed HEP in detail   Biking 10 min 10 min for warm up 8 min warm up   Taping · Taped cuboid into pronation, used 2 I strips of kinesio tape  · Taped patella into distraction using kinesio tape · Taped cuboid into pronation, used 2 I strips of kinesio tape  · Taped patella into distraction using kinesio tape · Taped cuboid into pronation, used 2 I strips of kinesio tape  · Taped patella into distraction using kinesio tape   ankle eversion Isometric hold, 10 sec, 10x 10x, 2 sets, manual resistance 10x, 2 sets, with medial talar glide   Gait training      Terminal knee extension w/ step up 6\", 12x, 3 sets, bilateral  (attempted 8\" but it was painful) 6\", 10x, 3 sets HEP   Ankle proprioception Step over, unstable surface, 10x, 2 sets  SL balance with anterior-posterior weight shift Step over, unstable surface, 10x, 2 sets  SL balance with anterior-posterior weight shift HEP   Calf stretch 60 sec, 3x 60 sec, 3x 60 sec   Quad stretch 60 sec, 2 sets 60 sec 60 sec, 2x   Heel raises  10x, 2 sets, SL  HEP   Side-lying hip abduction  10x, 3 sets - HEP   Manual Therapy: (40 min) Done in order to improve joint and soft tissue mobility,reduce muscle guarding, and decrease muscle tone  · Patella mobilization, superior-inferior, medial-lateral and distraction using suction done at 0, 15, 45 and 90 deg flex  · Talocrural posterior-medial glide to facilitated DF, in supine   · Calcaneal distraction with DF of the ankle followed by eversion   · Midfoot mobilization while keeping cuboid stable   · Cuboid whip manipulation in prone, grade V   · Mobilize medial  Gastroc and soleus, done with active DF/PF (not done today)  Modalities: (-) Done in order to reduce swelling and pain  -  Treatment/Session Assessment: Mr. Rodger Ayoub tolerated the session well. He is doing relatively well with the 6\" step up box but cant go higher than that without pain. Patella mobility is good when testing in non weight bearing position. Talocrural posterior mobility has also improved. Showed him how to mobilize his own patella using a plunger as well how to properly tape.   · Pain: Initial: no pain at rest, increased with squatting activities  Post Session: no pain reported at rest     Total Treatment Duration:60 min, re-ivis 5 min  PT Patient Time In/Time Out  Time In: 5684  Time Out: Quinton Beck 92 PT, DPT

## 2019-02-15 ENCOUNTER — APPOINTMENT (OUTPATIENT)
Dept: PHYSICAL THERAPY | Age: 54
End: 2019-02-15
Payer: COMMERCIAL

## 2023-02-22 NOTE — PROGRESS NOTES
-Continue with Keppra, atorvastatin, blood pressure goal less than 140/90  -on 02/06/2023 recommendation was documented that patient will need an MRI brain with and without contrast in 4-6 weeks to rule out hemorrhagic infarct or mass and patient is to be followed by Dr. 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 5 = High Risk)  MD Orders: evaluate and treat MEDICAL/REFERRING DIAGNOSIS:Right ankle sprain,chondromalacia right knee  DATE OF ONSET:   REFERRING PHYSICIAN:Marisol Deluna PA  RETURN PHYSICIAN APPOINTMENT: did not specify       ASSESSMENT:  Mr. Jolly Hines has come for a total of 17  visits since starting physical therapy on 18. During that time he reports a 50-60% overall improvement and has full ROM of the knee and ankle. His knee pain seems to be most related to patella compression and the lateral foot pain is mostly due to cuboid instability and peroneal tendinopath. He continues to not be able to tolerate squatting activities. Using taping to distract the patella and stabilize the cuboid helps some of the pain but does not fully take it away. At this point bracing should be considered to mimic the taping and potentially other medical interventions since significant improvements have not been seen in the last couple weeks. Skilled physical therapy is recommended to continue progressing his plan of care. PROBLEM LIST (Impacting functional limitations):  1. Decreased Strength  2. Decreased ADL/Functional Activities  3. Increased Pain  4. Decreased Activity Tolerance  5. Decreased Flexibility/Joint Mobility  6. Decreased [place] with Home Exercise Program INTERVENTIONS PLANNED:  1. Cold  2. Heat  3. Home Exercise Program (HEP)  4. Manual Therapy  5. Range of Motion (ROM)  6.  Therapeutic Exercise/Strengthening     TREATMENT PLAN:  Effective Dates:  18 TO 19  Frequency/Duration: 2 times a week for 8 wks  GOALS: (Goals have been discussed and agreed upon with patient.)  SHORT-TERM FUNCTIONAL GOALS: Time Frame: 2-4 wks  1. Patient will report 25-50% reduction in overall symptoms (MET)  2. Patient will be compliant with HEP and plan of care (MET)  3. Patient will have 0-125 deg ROM of the knee (MET)   4. Patient will improve swelling by 1 cm on the figure 8 measurement  (MET)  5. Patient will improve on the LEFS by 5-7 points (MET)  DISCHARGE GOALS: Time Frame: 10-12 wk  1. Patient will report % reduction in overall symptoms in order to be able to have full function with decreased symptoms (ongoing)  2. Patient will be able to squat with minimal pain as needed at his job with minimal pain (0-2/10) (ongoing)   3. Patient will be able to ambulate with a symmetrical gait pattern as judged by therapist in order to able to perform activities of daily living (ongoing)  4. Patient will score 60 or greater on the LEFS points in order to demonstrate improved function with less pain    Rehabilitation Potential For Stated Goals: Good    Regarding Latesha Simpler therapy, I certify that the treatment plan above will be carried out by a therapist or under their direction. Thank you for this referral,  Oz Sprague PT,DPT              HISTORY:   History of Present Injury/Illness (Reason for Referral): Reports that he fell at work on 8-1-18. He had puncture wounds in his lower leg. He had a lot of bleeding during that time requiring stiches in one of the wounds. He had an MRI on his knee revealed a meniscus tear. He went to the medical facility at [facility] and they sent him to the ED where he got stiches for his wounds. The next day he went to the work well doctor and was then referred to Dr. Nando Cartagena. He also had hand tingling afterwards. He was put on a blood thinner to prevent blood clots after having a negative ultrasound. He had a steroid shot in the knee with minimal benefit at this point.  No numbness in the foot reported. 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 2 stents, diabetes type II   Social History/Living Environment: lives in a house with family  Prior Level of Function/Work/Activity:independent / works at [employer]  Current Medications:   Aspirin, losartan, clopidogrel, omprezole, atovastatin, metoprolol, metformin, claratan   Date Last Reviewed:  12/12/2018   Number of Personal Factors/Comorbidities that affect the Plan of Care: 0: LOW COMPLEXITY   EXAMINATION:   (Abbreviations: P-pain, NP- no pain, wnl-within normal limits)    Observation/Orthostatic Postural Assessment:    Relaxed standing posture:  -     Palpation/tone/tissue texture:    ASIS: n/a  PSIS:n/a  Leg Length: supine:n/a         Long Sitting: n/a      Soft tissue:  · Hip flexors:mild tightness in R quad  · Hamstrings: increased tightness on R  · IT band: na  · gastroc/soleus/posterior tibialis: increased tightness on R (improving)  · Anterior tibialis: Increased tightness on R   (improved)    ROM:   Knee ROM Date:  11-19-18       Right Left   Flexion 135 140   Extension 0 deg  0      Ankle ROM Date:  11-19-18       Right Left   DF 10 10   PF wnl wnl   Inversion - -   Eversion - -           Strength:     Hip Strength Date:  11-19-18       Right Left   Flexion 5 5   Extension 5 5   IR n/a n/a   ER n/a n/a   Abduction 5 5      Knee Strength Date:  11-19-18       Right Left   Flexion 5 5   Extension 4 5             Special Tests:   Stability tests:  Lachmans: (-)  [place]:  AM: (-)          AL: (-)  Posterior drawer: (-)  Posterior Sag: (-)    Varus: 0 deg: n/a today   30 deg: n/a today  Valgus: 0 deg: n/a today   30 deg: n/a today    Meniscus cluster test  1. End range flexion: -  2. End range extension: (-)  3. Joint line tenderness: medical joint line tenderness  (mild)  4. Report of locking: (-)  5.  McMurreys: n/a    Neurological Screen:   Myotomes: strong in bilateral LE's Dermatomes: intact in bilateral LE's         Reflexes: n/a         Neural Tension Tests: -  Functional Mobility:    · Double leg squat: able to do a function squat (does report increased pain with it  · Single leg squat:  L:functional        R: able to do a partial squat with increased pain  · Gait Pattern: ambulates with decreased terminal stance on R  (improved)  Balance:  Single leg   L: 10 sec, minimal sway  R: 10 sec, minimal sway        CLINICAL DECISION MAKING:   Outcome Measure: Tool Used: Lower Extremity Functional Scale (LEFS)  Score:  Initial: 27/80 43/80 (Date: 9-26-18 ) Most Recent: 36/80 (Date 11-19-18)   Interpretation of Score: 20 questions each scored on a 5 point scale with 0 representing \"extreme difficulty or unable to perform\" and 4 representing \"no difficulty\". The lower the score, the greater the functional disability. 80/80 represents no disability. Minimal detectable change is 9 points. Score 80 79-63 62-48 47-32 31-16 15-1 0   Modifier CH CI CJ CK CL CM CN     Medical Necessity:   · Patient is expected to demonstrate progress in strength, range of motion and symptom levels to return to full function. Reason for Services/Other Comments:  · Patient continues to require skilled intervention due to ongoing symptoms. TREATMENT:   (In addition to Assessment/Re-Assessment sessions the following treatments were rendered)    Subjective Pre-Treatment Symptoms: Reports that he is doing pretty good. He saw the doctor who is going to do the injections in the near future. Therapeutic Exercise: (30 min) Done in order to improve strength, ROM and understanding of current condition.     Date  11-28-18 Date  12-5-18 Date  12-12-18   Activity/Exercise      Education Discussed HEP Discussed HEP Discussed progressing with HEP   Biking 10 min 10 min 8 min for warm up   Taping · Taped cuboid into pronation, used 2 I strips of kinesio tape  · Taped patella into distraction using kinesio tape · Taped cuboid into pronation, used 2 I strips of kinesio tape  · Taped patella into distraction using kinesio tape · Taped cuboid into pronation, used 2 I strips of kinesio tape  · Taped patella into distraction using kinesio tape   ankle eversion Manual resistance, 10 sec, 5x Manual resistance, 10x, 2 sets, eccentric control focus Manual resistance, 10x, 2 sets, eccentric focus   Gait training -     Terminal knee extension w/ step up 2\", 10x, 3 sets  4\", 10x 4\", 10x, 3 sets 6\", 8x  4\" 10x, 2 sets   Ankle proprioception · Wobble board, eyes closed, in sitting, 5 min total  · SL balance- HEP - SL balance with anterior-posterior weight shift, 20 sec, 5x   Calf stretch 60 sec 60 sec 60 sec   Quad stretch 60 sec 60 sec, 2x 60 sec   SLR HEP  HEP   Heel raises 10x, 3 sets SL, 10x, 3 sets SL, 10x, 3 sets   Manual Therapy: (20) Done in order to improve joint and soft tissue mobility,reduce muscle guarding, and decrease muscle tone  · Patella mobilization, superior-inferior, medial-lateral and distraction using suction done at 0, 15 and 90 deg flex  · Talocrural posterior-medial glide to facilitated DF, in supine   · Calcaneal distraction with DF of the ankle  · Midfoot mobilization while keeping cuboid stable   · Cuboid whip manipulation in prone, grade V  · Mobilize medial  Gastroc, done with active DF/PF  Modalities: ( 10 min) Done in order to reduce swelling and pain  Ice x 10min  Treatment/Session Assessment: Mr. Nirmal Cross tolerated the session well. Attempted a higher step and he tolerated about 6-8 reps before onset of knee pain. Continuing to gradual add height to improve cartilage tolerance. Overall progress is still being seen but is has been slow. Discussed working more on SL balance and heel raises. · Pain: Initial:   No symptoms at rest Post Session:  No pain at rest ·   Compliance with Program/Exercises: compliant   · Recommendations/Intent for next treatment session:  \"Next visit will focus on progressing the patients plan of care\".     Future Appointments   Date Time Provider Alan Plunkett   12/17/2018 10:00 AM [facility]   12/19/2018  9:45 AM Aline Payan PT, DPT [facility]     Total Treatment Duration:60 min  PT Patient Time In/Time Out  Time In: 1630  Time Out: 101 Huber Layton PT, DPT